# Patient Record
Sex: MALE | Race: WHITE | NOT HISPANIC OR LATINO | URBAN - METROPOLITAN AREA
[De-identification: names, ages, dates, MRNs, and addresses within clinical notes are randomized per-mention and may not be internally consistent; named-entity substitution may affect disease eponyms.]

---

## 2023-09-24 VITALS
HEART RATE: 89 BPM | WEIGHT: 199.96 LBS | HEIGHT: 72 IN | DIASTOLIC BLOOD PRESSURE: 87 MMHG | RESPIRATION RATE: 16 BRPM | OXYGEN SATURATION: 96 % | TEMPERATURE: 99 F | SYSTOLIC BLOOD PRESSURE: 142 MMHG

## 2023-09-24 LAB
ALBUMIN SERPL ELPH-MCNC: 3.8 G/DL — SIGNIFICANT CHANGE UP (ref 3.4–5)
ALP SERPL-CCNC: 56 U/L — SIGNIFICANT CHANGE UP (ref 40–120)
ALT FLD-CCNC: 25 U/L — SIGNIFICANT CHANGE UP (ref 12–42)
ANION GAP SERPL CALC-SCNC: 8 MMOL/L — LOW (ref 9–16)
APPEARANCE UR: CLEAR — SIGNIFICANT CHANGE UP
AST SERPL-CCNC: 13 U/L — LOW (ref 15–37)
BASOPHILS # BLD AUTO: 0 K/UL — SIGNIFICANT CHANGE UP (ref 0–0.2)
BASOPHILS NFR BLD AUTO: 0 % — SIGNIFICANT CHANGE UP (ref 0–2)
BILIRUB SERPL-MCNC: 1.9 MG/DL — HIGH (ref 0.2–1.2)
BILIRUB UR-MCNC: NEGATIVE — SIGNIFICANT CHANGE UP
BUN SERPL-MCNC: 15 MG/DL — SIGNIFICANT CHANGE UP (ref 7–23)
CALCIUM SERPL-MCNC: 9.1 MG/DL — SIGNIFICANT CHANGE UP (ref 8.5–10.5)
CHLORIDE SERPL-SCNC: 101 MMOL/L — SIGNIFICANT CHANGE UP (ref 96–108)
CO2 SERPL-SCNC: 26 MMOL/L — SIGNIFICANT CHANGE UP (ref 22–31)
COLOR SPEC: YELLOW — SIGNIFICANT CHANGE UP
CREAT SERPL-MCNC: 1.11 MG/DL — SIGNIFICANT CHANGE UP (ref 0.5–1.3)
DIFF PNL FLD: NEGATIVE — SIGNIFICANT CHANGE UP
EGFR: 80 ML/MIN/1.73M2 — SIGNIFICANT CHANGE UP
EOSINOPHIL # BLD AUTO: 0 K/UL — SIGNIFICANT CHANGE UP (ref 0–0.5)
EOSINOPHIL NFR BLD AUTO: 0 % — SIGNIFICANT CHANGE UP (ref 0–6)
GLUCOSE SERPL-MCNC: 166 MG/DL — HIGH (ref 70–99)
GLUCOSE UR QL: NEGATIVE MG/DL — SIGNIFICANT CHANGE UP
HCT VFR BLD CALC: 42.4 % — SIGNIFICANT CHANGE UP (ref 39–50)
HGB BLD-MCNC: 14.7 G/DL — SIGNIFICANT CHANGE UP (ref 13–17)
KETONES UR-MCNC: NEGATIVE MG/DL — SIGNIFICANT CHANGE UP
LEUKOCYTE ESTERASE UR-ACNC: NEGATIVE — SIGNIFICANT CHANGE UP
LIDOCAIN IGE QN: 60 U/L — SIGNIFICANT CHANGE UP (ref 16–77)
LYMPHOCYTES # BLD AUTO: 0.95 K/UL — LOW (ref 1–3.3)
LYMPHOCYTES # BLD AUTO: 4 % — LOW (ref 13–44)
MCHC RBC-ENTMCNC: 32 PG — SIGNIFICANT CHANGE UP (ref 27–34)
MCHC RBC-ENTMCNC: 34.7 GM/DL — SIGNIFICANT CHANGE UP (ref 32–36)
MCV RBC AUTO: 92.4 FL — SIGNIFICANT CHANGE UP (ref 80–100)
MONOCYTES # BLD AUTO: 2.61 K/UL — HIGH (ref 0–0.9)
MONOCYTES NFR BLD AUTO: 11 % — SIGNIFICANT CHANGE UP (ref 2–14)
NEUTROPHILS # BLD AUTO: 20.19 K/UL — HIGH (ref 1.8–7.4)
NEUTROPHILS NFR BLD AUTO: 78 % — HIGH (ref 43–77)
NITRITE UR-MCNC: NEGATIVE — SIGNIFICANT CHANGE UP
NRBC # BLD: SIGNIFICANT CHANGE UP /100 WBCS (ref 0–0)
PH UR: 6.5 — SIGNIFICANT CHANGE UP (ref 5–8)
PLATELET # BLD AUTO: 172 K/UL — SIGNIFICANT CHANGE UP (ref 150–400)
POTASSIUM SERPL-MCNC: 3.9 MMOL/L — SIGNIFICANT CHANGE UP (ref 3.5–5.3)
POTASSIUM SERPL-SCNC: 3.9 MMOL/L — SIGNIFICANT CHANGE UP (ref 3.5–5.3)
PROT SERPL-MCNC: 7.2 G/DL — SIGNIFICANT CHANGE UP (ref 6.4–8.2)
PROT UR-MCNC: NEGATIVE MG/DL — SIGNIFICANT CHANGE UP
RBC # BLD: 4.59 M/UL — SIGNIFICANT CHANGE UP (ref 4.2–5.8)
RBC # FLD: 11.9 % — SIGNIFICANT CHANGE UP (ref 10.3–14.5)
SODIUM SERPL-SCNC: 135 MMOL/L — SIGNIFICANT CHANGE UP (ref 132–145)
SP GR SPEC: 1.03 — SIGNIFICANT CHANGE UP (ref 1–1.03)
UROBILINOGEN FLD QL: 0.2 MG/DL — SIGNIFICANT CHANGE UP (ref 0.2–1)
WBC # BLD: 23.75 K/UL — HIGH (ref 3.8–10.5)
WBC # FLD AUTO: 23.75 K/UL — HIGH (ref 3.8–10.5)

## 2023-09-24 PROCEDURE — 74177 CT ABD & PELVIS W/CONTRAST: CPT | Mod: 26

## 2023-09-24 PROCEDURE — 99285 EMERGENCY DEPT VISIT HI MDM: CPT

## 2023-09-24 RX ORDER — SODIUM CHLORIDE 9 MG/ML
1000 INJECTION INTRAMUSCULAR; INTRAVENOUS; SUBCUTANEOUS ONCE
Refills: 0 | Status: COMPLETED | OUTPATIENT
Start: 2023-09-24 | End: 2023-09-24

## 2023-09-24 RX ORDER — MORPHINE SULFATE 50 MG/1
4 CAPSULE, EXTENDED RELEASE ORAL ONCE
Refills: 0 | Status: DISCONTINUED | OUTPATIENT
Start: 2023-09-24 | End: 2023-09-24

## 2023-09-24 RX ORDER — SODIUM CHLORIDE 9 MG/ML
1000 INJECTION, SOLUTION INTRAVENOUS
Refills: 0 | Status: COMPLETED | OUTPATIENT
Start: 2023-09-24 | End: 2023-09-25

## 2023-09-24 RX ORDER — PIPERACILLIN AND TAZOBACTAM 4; .5 G/20ML; G/20ML
3.38 INJECTION, POWDER, LYOPHILIZED, FOR SOLUTION INTRAVENOUS ONCE
Refills: 0 | Status: COMPLETED | OUTPATIENT
Start: 2023-09-24 | End: 2023-09-24

## 2023-09-24 RX ORDER — ONDANSETRON 8 MG/1
4 TABLET, FILM COATED ORAL ONCE
Refills: 0 | Status: COMPLETED | OUTPATIENT
Start: 2023-09-24 | End: 2023-09-24

## 2023-09-24 RX ORDER — FAMOTIDINE 10 MG/ML
20 INJECTION INTRAVENOUS ONCE
Refills: 0 | Status: COMPLETED | OUTPATIENT
Start: 2023-09-24 | End: 2023-09-24

## 2023-09-24 RX ORDER — KETOROLAC TROMETHAMINE 30 MG/ML
15 SYRINGE (ML) INJECTION ONCE
Refills: 0 | Status: DISCONTINUED | OUTPATIENT
Start: 2023-09-24 | End: 2023-09-24

## 2023-09-24 RX ADMIN — Medication 15 MILLIGRAM(S): at 21:15

## 2023-09-24 RX ADMIN — PIPERACILLIN AND TAZOBACTAM 200 GRAM(S): 4; .5 INJECTION, POWDER, LYOPHILIZED, FOR SOLUTION INTRAVENOUS at 21:53

## 2023-09-24 RX ADMIN — ONDANSETRON 4 MILLIGRAM(S): 8 TABLET, FILM COATED ORAL at 21:15

## 2023-09-24 RX ADMIN — FAMOTIDINE 20 MILLIGRAM(S): 10 INJECTION INTRAVENOUS at 21:15

## 2023-09-24 RX ADMIN — MORPHINE SULFATE 4 MILLIGRAM(S): 50 CAPSULE, EXTENDED RELEASE ORAL at 21:53

## 2023-09-24 RX ADMIN — SODIUM CHLORIDE 1000 MILLILITER(S): 9 INJECTION INTRAMUSCULAR; INTRAVENOUS; SUBCUTANEOUS at 21:15

## 2023-09-24 NOTE — ED PROVIDER NOTE - GENITOURINARY NEGATIVE STATEMENT, MLM
Left message on answering machine to call back. Patient was due for INR recheck on 12/31/18. Lab order already in place.
no dysuria, no frequency, and no hematuria.

## 2023-09-24 NOTE — ED ADULT NURSE NOTE - OBJECTIVE STATEMENT
Pt. walk in from urgent care c/o RLQ abd pain since last night with fever (100.9 at home) and vomiting.

## 2023-09-24 NOTE — ED PROVIDER NOTE - NS ED ATTENDING STATEMENT MOD
This was a shared visit with the EULA. I reviewed and verified the documentation and independently performed the documented:

## 2023-09-24 NOTE — ED PROVIDER NOTE - ATTENDING APP SHARED VISIT CONTRIBUTION OF CARE
I saw and evaluated the patient. I discussed the case with the EULA/resident and agree with the findings and helped develop the plan of care as documented in the EULA/resident's note. I agree with the findings and plan of care as documented in the EULA/resident's note.

## 2023-09-24 NOTE — ED PROVIDER NOTE - PHYSICAL EXAMINATION
Gen - WDWN M, NAD, comfortable and non-toxic appearing  Skin - warm, dry, intact   HEENT - AT/NC, no nasal discharge, airway patent, neck supple and FROM  CV - S1S2, R/R/R  Resp - CTAB, no r/r/w  GI - NABS, soft, ND, RLQ TTP with guarding, no rebound, no CVAT b/l   MS - No acute or gross deformities noted to extremities. No midline spinal tenderness or step off on palpation  Neuro - AxOx3, ambulatory without gait disturbance

## 2023-09-24 NOTE — ED PROVIDER NOTE - CLINICAL SUMMARY MEDICAL DECISION MAKING FREE TEXT BOX
54 yo M with PMHx of GERD, presenting c/o abdominal pain with N/V/C x 1d  - check labs, U/A, IV hydration, antiemetics, pain control, CT A/P and reassess  DDx - appendicitis, biliary colic, constipation, UTI, renal colic 52 yo M with PMHx of GERD, presenting c/o abdominal pain with N/V/C x 1d  - check labs, U/A, IV hydration, antiemetics, pain control, CT A/P and reassess  DDx - appendicitis, biliary colic, constipation, UTI, renal colic, perforated viscus    reassessed 11:00pm - pain is improved but still persistent, afebrile, VSS otherwise, abd soft, awaiting final CTr     CT A/P - 1.  Acute appendicitis with retrocecal appendix. No extraluminal gas or   organized collection.  2.  1.2 cm hypodense liver lesion in hepatic segment 8. MRI of the   abdomen with and without gadolinium would be useful for further   characterization of liver lesion.  3.  Hook configuration of the proximal celiac artery, which could be seen   with median arcuate ligament syndrome. Clinical correlation is   recommended.  4.  1.2 cm rounded lucent focus in the left iliac bone, with narrow   sclerotic margins and central sclerotic nidus, probably representing   osteoid osteoma  dx and incidental findings discussed with pt and spouse at bedside, keep NPO, pain control, IVF, will transfer to [facility] under Dr. Orr's service for acute appy, plan for OR intervention tonight

## 2023-09-24 NOTE — ED ADULT TRIAGE NOTE - CHIEF COMPLAINT QUOTE
Pt. walk in from urgent care c/o RLQ abd pain since last night with fever (100.9 at home) and vomiting. Pt. ttp RUQ.

## 2023-09-25 ENCOUNTER — INPATIENT (INPATIENT)
Facility: HOSPITAL | Age: 53
LOS: 3 days | Discharge: HOME | DRG: 853 | End: 2023-09-29
Attending: SURGERY | Admitting: SURGERY
Payer: COMMERCIAL

## 2023-09-25 DIAGNOSIS — Z98.890 OTHER SPECIFIED POSTPROCEDURAL STATES: Chronic | ICD-10-CM

## 2023-09-25 LAB
ALBUMIN SERPL ELPH-MCNC: 3.3 G/DL — SIGNIFICANT CHANGE UP (ref 3.3–5)
ALBUMIN SERPL ELPH-MCNC: 3.3 G/DL — SIGNIFICANT CHANGE UP (ref 3.3–5)
ALP SERPL-CCNC: 37 U/L — LOW (ref 40–120)
ALP SERPL-CCNC: 42 U/L — SIGNIFICANT CHANGE UP (ref 40–120)
ALT FLD-CCNC: 13 U/L — SIGNIFICANT CHANGE UP (ref 10–45)
ALT FLD-CCNC: 13 U/L — SIGNIFICANT CHANGE UP (ref 10–45)
ANION GAP SERPL CALC-SCNC: 8 MMOL/L — SIGNIFICANT CHANGE UP (ref 5–17)
APTT BLD: 28.3 SEC — SIGNIFICANT CHANGE UP (ref 24.5–35.6)
AST SERPL-CCNC: 10 U/L — SIGNIFICANT CHANGE UP (ref 10–40)
AST SERPL-CCNC: 9 U/L — LOW (ref 10–40)
BILIRUB DIRECT SERPL-MCNC: 0.5 MG/DL — HIGH (ref 0–0.3)
BILIRUB INDIRECT FLD-MCNC: 1.8 MG/DL — HIGH (ref 0.2–1)
BILIRUB SERPL-MCNC: 2.2 MG/DL — HIGH (ref 0.2–1.2)
BILIRUB SERPL-MCNC: 2.3 MG/DL — HIGH (ref 0.2–1.2)
BLD GP AB SCN SERPL QL: NEGATIVE — SIGNIFICANT CHANGE UP
BLD GP AB SCN SERPL QL: NEGATIVE — SIGNIFICANT CHANGE UP
BUN SERPL-MCNC: 13 MG/DL — SIGNIFICANT CHANGE UP (ref 7–23)
CALCIUM SERPL-MCNC: 8.7 MG/DL — SIGNIFICANT CHANGE UP (ref 8.4–10.5)
CHLORIDE SERPL-SCNC: 106 MMOL/L — SIGNIFICANT CHANGE UP (ref 96–108)
CO2 SERPL-SCNC: 24 MMOL/L — SIGNIFICANT CHANGE UP (ref 22–31)
CREAT SERPL-MCNC: 1.14 MG/DL — SIGNIFICANT CHANGE UP (ref 0.5–1.3)
EGFR: 77 ML/MIN/1.73M2 — SIGNIFICANT CHANGE UP
GLUCOSE SERPL-MCNC: 165 MG/DL — HIGH (ref 70–99)
GRAM STN FLD: SIGNIFICANT CHANGE UP
HCT VFR BLD CALC: 39.2 % — SIGNIFICANT CHANGE UP (ref 39–50)
HGB BLD-MCNC: 13.7 G/DL — SIGNIFICANT CHANGE UP (ref 13–17)
INR BLD: 1.16 — SIGNIFICANT CHANGE UP (ref 0.85–1.18)
LACTATE BLDV-MCNC: 1.2 MMOL/L — SIGNIFICANT CHANGE UP (ref 0.5–2)
MAGNESIUM SERPL-MCNC: 1.5 MG/DL — LOW (ref 1.6–2.6)
MCHC RBC-ENTMCNC: 32.2 PG — SIGNIFICANT CHANGE UP (ref 27–34)
MCHC RBC-ENTMCNC: 34.9 GM/DL — SIGNIFICANT CHANGE UP (ref 32–36)
MCV RBC AUTO: 92.2 FL — SIGNIFICANT CHANGE UP (ref 80–100)
NRBC # BLD: 0 /100 WBCS — SIGNIFICANT CHANGE UP (ref 0–0)
PHOSPHATE SERPL-MCNC: 2.6 MG/DL — SIGNIFICANT CHANGE UP (ref 2.5–4.5)
PLATELET # BLD AUTO: 134 K/UL — LOW (ref 150–400)
POTASSIUM SERPL-MCNC: 4.3 MMOL/L — SIGNIFICANT CHANGE UP (ref 3.5–5.3)
POTASSIUM SERPL-SCNC: 4.3 MMOL/L — SIGNIFICANT CHANGE UP (ref 3.5–5.3)
PROT SERPL-MCNC: 5.8 G/DL — LOW (ref 6–8.3)
PROT SERPL-MCNC: 5.8 G/DL — LOW (ref 6–8.3)
PROTHROM AB SERPL-ACNC: 13 SEC — SIGNIFICANT CHANGE UP (ref 9.5–13)
RBC # BLD: 4.25 M/UL — SIGNIFICANT CHANGE UP (ref 4.2–5.8)
RBC # FLD: 12.2 % — SIGNIFICANT CHANGE UP (ref 10.3–14.5)
RH IG SCN BLD-IMP: POSITIVE — SIGNIFICANT CHANGE UP
RH IG SCN BLD-IMP: POSITIVE — SIGNIFICANT CHANGE UP
SODIUM SERPL-SCNC: 138 MMOL/L — SIGNIFICANT CHANGE UP (ref 135–145)
SPECIMEN SOURCE: SIGNIFICANT CHANGE UP
WBC # BLD: 17.8 K/UL — HIGH (ref 3.8–10.5)
WBC # FLD AUTO: 17.8 K/UL — HIGH (ref 3.8–10.5)

## 2023-09-25 PROCEDURE — 88305 TISSUE EXAM BY PATHOLOGIST: CPT | Mod: 26

## 2023-09-25 PROCEDURE — 99222 1ST HOSP IP/OBS MODERATE 55: CPT

## 2023-09-25 PROCEDURE — 88112 CYTOPATH CELL ENHANCE TECH: CPT | Mod: 26

## 2023-09-25 PROCEDURE — 88304 TISSUE EXAM BY PATHOLOGIST: CPT | Mod: 26

## 2023-09-25 RX ORDER — HYDROMORPHONE HYDROCHLORIDE 2 MG/ML
0.5 INJECTION INTRAMUSCULAR; INTRAVENOUS; SUBCUTANEOUS
Refills: 0 | Status: DISCONTINUED | OUTPATIENT
Start: 2023-09-25 | End: 2023-09-26

## 2023-09-25 RX ORDER — ONDANSETRON 8 MG/1
4 TABLET, FILM COATED ORAL EVERY 6 HOURS
Refills: 0 | Status: DISCONTINUED | OUTPATIENT
Start: 2023-09-25 | End: 2023-09-25

## 2023-09-25 RX ORDER — HYDROMORPHONE HYDROCHLORIDE 2 MG/ML
0.25 INJECTION INTRAMUSCULAR; INTRAVENOUS; SUBCUTANEOUS EVERY 4 HOURS
Refills: 0 | Status: DISCONTINUED | OUTPATIENT
Start: 2023-09-25 | End: 2023-09-27

## 2023-09-25 RX ORDER — HEPARIN SODIUM 5000 [USP'U]/ML
5000 INJECTION INTRAVENOUS; SUBCUTANEOUS EVERY 8 HOURS
Refills: 0 | Status: DISCONTINUED | OUTPATIENT
Start: 2023-09-25 | End: 2023-09-29

## 2023-09-25 RX ORDER — HYDROMORPHONE HYDROCHLORIDE 2 MG/ML
0.5 INJECTION INTRAMUSCULAR; INTRAVENOUS; SUBCUTANEOUS EVERY 4 HOURS
Refills: 0 | Status: DISCONTINUED | OUTPATIENT
Start: 2023-09-25 | End: 2023-09-27

## 2023-09-25 RX ORDER — ONDANSETRON 8 MG/1
4 TABLET, FILM COATED ORAL EVERY 6 HOURS
Refills: 0 | Status: DISCONTINUED | OUTPATIENT
Start: 2023-09-25 | End: 2023-09-29

## 2023-09-25 RX ORDER — ACETAMINOPHEN 500 MG
1000 TABLET ORAL ONCE
Refills: 0 | Status: COMPLETED | OUTPATIENT
Start: 2023-09-25 | End: 2023-09-25

## 2023-09-25 RX ORDER — PIPERACILLIN AND TAZOBACTAM 4; .5 G/20ML; G/20ML
3.38 INJECTION, POWDER, LYOPHILIZED, FOR SOLUTION INTRAVENOUS EVERY 8 HOURS
Refills: 0 | Status: DISCONTINUED | OUTPATIENT
Start: 2023-09-25 | End: 2023-09-29

## 2023-09-25 RX ADMIN — HEPARIN SODIUM 5000 UNIT(S): 5000 INJECTION INTRAVENOUS; SUBCUTANEOUS at 06:54

## 2023-09-25 RX ADMIN — HEPARIN SODIUM 5000 UNIT(S): 5000 INJECTION INTRAVENOUS; SUBCUTANEOUS at 21:03

## 2023-09-25 RX ADMIN — PIPERACILLIN AND TAZOBACTAM 25 GRAM(S): 4; .5 INJECTION, POWDER, LYOPHILIZED, FOR SOLUTION INTRAVENOUS at 14:15

## 2023-09-25 RX ADMIN — Medication 15 MILLIGRAM(S): at 00:22

## 2023-09-25 RX ADMIN — SODIUM CHLORIDE 125 MILLILITER(S): 9 INJECTION, SOLUTION INTRAVENOUS at 01:06

## 2023-09-25 RX ADMIN — PIPERACILLIN AND TAZOBACTAM 25 GRAM(S): 4; .5 INJECTION, POWDER, LYOPHILIZED, FOR SOLUTION INTRAVENOUS at 21:03

## 2023-09-25 RX ADMIN — HEPARIN SODIUM 5000 UNIT(S): 5000 INJECTION INTRAVENOUS; SUBCUTANEOUS at 14:14

## 2023-09-25 RX ADMIN — SODIUM CHLORIDE 125 MILLILITER(S): 9 INJECTION, SOLUTION INTRAVENOUS at 21:04

## 2023-09-25 RX ADMIN — MORPHINE SULFATE 4 MILLIGRAM(S): 50 CAPSULE, EXTENDED RELEASE ORAL at 00:22

## 2023-09-25 RX ADMIN — PIPERACILLIN AND TAZOBACTAM 25 GRAM(S): 4; .5 INJECTION, POWDER, LYOPHILIZED, FOR SOLUTION INTRAVENOUS at 06:54

## 2023-09-25 RX ADMIN — Medication 1000 MILLIGRAM(S): at 17:03

## 2023-09-25 RX ADMIN — HYDROMORPHONE HYDROCHLORIDE 0.5 MILLIGRAM(S): 2 INJECTION INTRAMUSCULAR; INTRAVENOUS; SUBCUTANEOUS at 23:45

## 2023-09-25 RX ADMIN — Medication 400 MILLIGRAM(S): at 16:33

## 2023-09-25 RX ADMIN — HYDROMORPHONE HYDROCHLORIDE 0.5 MILLIGRAM(S): 2 INJECTION INTRAMUSCULAR; INTRAVENOUS; SUBCUTANEOUS at 23:21

## 2023-09-25 NOTE — H&P ADULT - HISTORY OF PRESENT ILLNESS
Patient is a 53 yr old male pmh of GERD who presented to [facility] ED after 1x day of severe RLQ abdominal pain, nausea, vomiting, chills and fever. Patient state she had been experiencing a bit of constipation earlier in the day for which he trialed a fleet enema and was successful in having a BM. Overnight he became constipated and felt bloated again and around 10 pm with severe sharp stabbing pain in the RLQ. Per patient the pain does not radiate anywhere and has been constant with waxing and waning in severity since onset. He has had 4 episodes of NBNB emesis today and fever of 100.9 at home. Denies melena, hematochezia, hematuria, change in urinary function, dysuria, d/c, flank pain, HA, dizziness, focal weakness, CP, and SOB.    In the [facility] ED patient VSS were stable and abdominal exam was significant for NABS, soft, ND, RLQ TTP with guarding, no rebound     Patient is a 53 yr old male pmh of GERD who presented to [facility] ED after 1x day hx(Since 9/23) of severe RLQ abdominal pain, nausea, vomiting, chills and fever. Patient states he had been experiencing a bit of constipation earlier in during the day of 9/23 for which he trialed a fleet enema and was successful in having a BM. Overnight he became constipated and felt bloated again and around 10 pm felt severe sharp stabbing pain in the RLQ . Per patient the pain does not radiate anywhere and has been constant with waxing and waning in severity since onset. He has had 4 episodes of NBNB emesis during the day of 9/24 and fever of 100.9 at home. Denies melena, hematochezia, hematuria, change in urinary function, dysuria, d/c, flank pain, HA, dizziness, focal weakness, CP, and SOB.    In the [facility] ED patient VSS were stable and abdominal exam was, soft, ND, RLQ TTP with guarding, no rebound. His Labs were significant for a wbc 23.75, hgb 14.7. CTAP w/ IV contrast revealed a dilated appendix up to 2.1 cm (2:98), contains fluid and fecal material, and demonstrates pronounced surrounding inflammatory changes consistent with acute appendicitis. Patient has not eaten or drank since 5 pm 9/24. He was made NPO and Started on IVF and given 1 dose of zosyn in ED.     Patient is a 53 yr old male pmh of GERD who presented to [facility] ED after 1x day hx(Since 9/23) of severe RLQ abdominal pain, nausea, vomiting, chills and fever. Patient states he had been experiencing a bit of constipation earlier during the day of 9/23 for which he trialed a fleet enema and was successful in having a BM. Overnight he became constipated and felt bloated again and around 10 pm felt severe sharp stabbing pain in the RLQ . Per patient the pain does not radiate anywhere and has been constant with waxing and waning in severity since onset. He has had 4 episodes of NBNB emesis during the day of 9/24 and fever of 100.9 at home. Denies melena, hematochezia, hematuria, change in urinary function, dysuria, d/c, flank pain, HA, dizziness, focal weakness, CP, and SOB.    In the [facility] ED patient VSS were stable and abdominal exam was, soft, ND, RLQ TTP with guarding, no rebound. His Labs were significant for a wbc 23.75, hgb 14.7. CTAP w/ IV contrast revealed a dilated appendix up to 2.1 cm (2:98), contains fluid and fecal material, and demonstrates pronounced surrounding inflammatory changes consistent with acute appendicitis. Patient has not eaten or drank since 5 pm 9/24. He was made NPO and Started on IVF and given 1 dose of zosyn in ED.

## 2023-09-25 NOTE — H&P ADULT - NSHPREVIEWOFSYSTEMS_GEN_ALL_CORE
Review of Systems:  · CONSTITUTIONAL: - - -  · Constitutional [+]: CHILLS, FEVER  · CARDIOVASCULAR: no chest pain and no edema.  · RESPIRATORY: no chest pain, no cough, and no shortness of breath.  · GASTROINTESTINAL: - - -  · Gastrointestinal [+]: ABDOMINAL PAIN, NAUSEA, VOMITING  · Gastrointestinal [-]: no diarrhea, no melena  · GENITOURINARY: no dysuria, no frequency, and no hematuria.  · MUSCULOSKELETAL: no back pain, no gout, no musculoskeletal pain, no neck pain, and no weakness.  · SKIN: no abrasions, no jaundice, no lesions, no pruritis, and no rashes.  · NEURO: no loss of consciousness, no gait abnormality, no headache, no sensory deficits, and no weakness.  · ROS STATEMENT: all other ROS negative except as per HPI

## 2023-09-25 NOTE — H&P ADULT - NSHPPHYSICALEXAM_GEN_ALL_CORE
T(C): 37.2 (09-25-23 @ 00:29), Max: 37.3 (09-24-23 @ 20:46)  HR: 100 (09-25-23 @ 01:06) (73 - 100)  BP: 142/66 (09-25-23 @ 01:06) (126/84 - 152/76)  RR: 18 (09-25-23 @ 01:06) (16 - 18)  SpO2: 97% (09-25-23 @ 01:06) (95% - 97%)    CONSTITUTIONAL: Well groomed, no apparent distress  EYES: PERRLA and symmetric, EOMI, No conjunctival or scleral injection, non-icteric  ENMT: Oral mucosa with moist membranes. Normal dentition; no pharyngeal injection or exudates   NECK: Supple, symmetric and without tracheal deviation   RESP: No respiratory distress, no use of accessory muscles; CTA b/l, no WRR  CV: RRR, +S1S2, no MRG; no JVD; no peripheral edema  GI: Soft, TENDER RLQ W/GUARDING , ND, no rebound, no palpable masses.  LYMPH: No cervical LAD or tenderness; no axillary LAD or tenderness; no inguinal LAD or tenderness  MSK: Normal gait; No digital clubbing or cyanosis; examination of the (head/neck/spine/ribs/pelvis, RUE, LUE, RLE, LLE) without misalignment,            Normal ROM without pain, no spinal tenderness, normal muscle strength/tone  SKIN: No rashes or ulcers noted; no subcutaneous nodules or induration palpable  NEURO: CN II-XII intact; normal reflexes in upper and lower extremities, sensation intact in upper and lower extremities b/l to light touch   PSYCH: Appropriate insight/judgment; A+O x 3, mood and affect appropriate, recent/remote memory intact

## 2023-09-25 NOTE — H&P ADULT - NSICDXFAMILYHX_GEN_ALL_CORE_FT
FAMILY HISTORY:  Father  Still living? Unknown  Family history of prostate cancer in father, Age at diagnosis: Age Unknown

## 2023-09-25 NOTE — H&P ADULT - ASSESSMENT
Patient is a 53 yr old male pmh of GERD who presented to [facility] ED after 1x day hx(Since 9/23) of severe RLQ abdominal pain, nausea, vomiting, chills and fever and labs significant for a WBC of 23.75 and CTAP of dilated 2.1cm appendix w/phlegmonous character consistent with a diagnosis of acute appendicitis.  Plan:  NPO/IVF  Pain/Nausea PRN  Zosyn  SCD/SQH  OR overnight

## 2023-09-25 NOTE — PROGRESS NOTE ADULT - ASSESSMENT
Patient is a 53 yr old male pmh of GERD who presented to [facility] ED after 1x day hx(Since 9/23) of severe RLQ abdominal pain, nausea, vomiting, chills and fever and labs significant for a WBC of 23.75 and CTAP of dilated 2.1cm appendix w/phlegmonous character consistent with a diagnosis of acute appendicitis.    NPO/IVF  Pain/Nausea PRN  Darline Landon  SCD/SQH  19 fr neto  AM labs  Tele

## 2023-09-25 NOTE — PATIENT PROFILE ADULT - FALL HARM RISK - ATTEMPT OOB
Has had several overdoses with prescription pills as per patient , the last in 2006. No defer details. tardive dyskinesia. COPD

## 2023-09-25 NOTE — PROGRESS NOTE ADULT - ASSESSMENT
Chief Complaint   Patient presents with     Hospital F/U     Inpatient discharge from [facility] on 7/13/2017 Prostate Cancer (H), Prostate Cancer       Melinda Bermeo/     acute Appendicitis s/p surgery  IV antibiotics  hyperbili likely from fasting in light of Gilbert's disease  patient has long history of elevated bili and with Gilbert's  when fasting it can go up  he has PCP in [facility] who sees him and mentioned bili in past  explained to him CT findings of liver  can follow up with PCP

## 2023-09-25 NOTE — BRIEF OPERATIVE NOTE - OPERATION/FINDINGS
Appendix identified gangrenous, purulent peritonitis of all 4 quadrants and pelvis. Mesoappendix divided using electrocautery. Appendix amputated at base at level of healthy tissue using PDS endoloop, amputation w/ ligasure, second endoloop over stump. Stump inspected laparoscopically. 9 L of NS washout. 19Fr neto drain placed. Fascia closed w/ 0 Maxon sutures. 4-0 monocryl skin. Dermabond.

## 2023-09-25 NOTE — H&P ADULT - NSHPLABSRESULTS_GEN_ALL_CORE
LABS:  cret                        14.7   23.75 )-----------( 172      ( 24 Sep 2023 21:06 )             42.4     09-24    135  |  101  |  15  ----------------------------<  166<H>  3.9   |  26  |  1.11    Ca    9.1      24 Sep 2023 21:06    TPro  7.2  /  Alb  3.8  /  TBili  1.9<H>  /  DBili  x   /  AST  13<L>  /  ALT  25  /  AlkPhos  56  09-24    PT/INR - ( 24 Sep 2023 23:45 )   PT: 13.0 sec;   INR: 1.16          PTT - ( 24 Sep 2023 23:45 )  PTT:28.3 sec        CTAP 9/24:  IMPRESSION:  1. Acute appendicitis with surrounding phlegmonous change. No extraluminal gas or organized collection.  2. Two ill-defined hypodense lesions in the right hepatic lobe measuring up to 1.2 cm, indeterminate. MRI of the abdomen with and without gadolinium would be useful for further characterization of liver lesion.  3. Two ill-defined enhancing lesions within the spleen measuring up to 1 cm, indeterminate. This can also be further evaluated on MRI

## 2023-09-25 NOTE — BRIEF OPERATIVE NOTE - NSICDXBRIEFPROCEDURE_GEN_ALL_CORE_FT
PROCEDURES:  Laparoscopic appendectomy 25-Sep-2023 04:02:52  Petra Giang  Laparoscopic washout of abdominal cavity 25-Sep-2023 04:02:56  Petra Giang

## 2023-09-26 LAB
ALBUMIN SERPL ELPH-MCNC: 3.2 G/DL — LOW (ref 3.3–5)
ALP SERPL-CCNC: 39 U/L — LOW (ref 40–120)
ALT FLD-CCNC: 12 U/L — SIGNIFICANT CHANGE UP (ref 10–45)
ANION GAP SERPL CALC-SCNC: 10 MMOL/L — SIGNIFICANT CHANGE UP (ref 5–17)
AST SERPL-CCNC: 10 U/L — SIGNIFICANT CHANGE UP (ref 10–40)
BILIRUB SERPL-MCNC: 2.2 MG/DL — HIGH (ref 0.2–1.2)
BUN SERPL-MCNC: 16 MG/DL — SIGNIFICANT CHANGE UP (ref 7–23)
CALCIUM SERPL-MCNC: 9 MG/DL — SIGNIFICANT CHANGE UP (ref 8.4–10.5)
CHLORIDE SERPL-SCNC: 102 MMOL/L — SIGNIFICANT CHANGE UP (ref 96–108)
CO2 SERPL-SCNC: 26 MMOL/L — SIGNIFICANT CHANGE UP (ref 22–31)
CREAT SERPL-MCNC: 1.13 MG/DL — SIGNIFICANT CHANGE UP (ref 0.5–1.3)
EGFR: 78 ML/MIN/1.73M2 — SIGNIFICANT CHANGE UP
GLUCOSE BLDC GLUCOMTR-MCNC: 134 MG/DL — HIGH (ref 70–99)
GLUCOSE SERPL-MCNC: 127 MG/DL — HIGH (ref 70–99)
HCT VFR BLD CALC: 38.9 % — LOW (ref 39–50)
HGB BLD-MCNC: 13.6 G/DL — SIGNIFICANT CHANGE UP (ref 13–17)
MAGNESIUM SERPL-MCNC: 1.9 MG/DL — SIGNIFICANT CHANGE UP (ref 1.6–2.6)
MCHC RBC-ENTMCNC: 32.5 PG — SIGNIFICANT CHANGE UP (ref 27–34)
MCHC RBC-ENTMCNC: 35 GM/DL — SIGNIFICANT CHANGE UP (ref 32–36)
MCV RBC AUTO: 92.8 FL — SIGNIFICANT CHANGE UP (ref 80–100)
NRBC # BLD: 0 /100 WBCS — SIGNIFICANT CHANGE UP (ref 0–0)
PHOSPHATE SERPL-MCNC: 2 MG/DL — LOW (ref 2.5–4.5)
PLATELET # BLD AUTO: 126 K/UL — LOW (ref 150–400)
POTASSIUM SERPL-MCNC: 3.9 MMOL/L — SIGNIFICANT CHANGE UP (ref 3.5–5.3)
POTASSIUM SERPL-SCNC: 3.9 MMOL/L — SIGNIFICANT CHANGE UP (ref 3.5–5.3)
PROT SERPL-MCNC: 6.1 G/DL — SIGNIFICANT CHANGE UP (ref 6–8.3)
RBC # BLD: 4.19 M/UL — LOW (ref 4.2–5.8)
RBC # FLD: 12.3 % — SIGNIFICANT CHANGE UP (ref 10.3–14.5)
SODIUM SERPL-SCNC: 138 MMOL/L — SIGNIFICANT CHANGE UP (ref 135–145)
WBC # BLD: 13.94 K/UL — HIGH (ref 3.8–10.5)
WBC # FLD AUTO: 13.94 K/UL — HIGH (ref 3.8–10.5)

## 2023-09-26 PROCEDURE — 99232 SBSQ HOSP IP/OBS MODERATE 35: CPT

## 2023-09-26 PROCEDURE — 71045 X-RAY EXAM CHEST 1 VIEW: CPT | Mod: 26

## 2023-09-26 RX ORDER — POTASSIUM PHOSPHATE, MONOBASIC POTASSIUM PHOSPHATE, DIBASIC 236; 224 MG/ML; MG/ML
15 INJECTION, SOLUTION INTRAVENOUS ONCE
Refills: 0 | Status: COMPLETED | OUTPATIENT
Start: 2023-09-26 | End: 2023-09-26

## 2023-09-26 RX ORDER — ACETAMINOPHEN 500 MG
1000 TABLET ORAL ONCE
Refills: 0 | Status: COMPLETED | OUTPATIENT
Start: 2023-09-26 | End: 2023-09-26

## 2023-09-26 RX ORDER — MAGNESIUM SULFATE 500 MG/ML
1 VIAL (ML) INJECTION ONCE
Refills: 0 | Status: COMPLETED | OUTPATIENT
Start: 2023-09-26 | End: 2023-09-26

## 2023-09-26 RX ORDER — FAMOTIDINE 10 MG/ML
20 INJECTION INTRAVENOUS DAILY
Refills: 0 | Status: DISCONTINUED | OUTPATIENT
Start: 2023-09-26 | End: 2023-09-26

## 2023-09-26 RX ORDER — PANTOPRAZOLE SODIUM 20 MG/1
40 TABLET, DELAYED RELEASE ORAL DAILY
Refills: 0 | Status: DISCONTINUED | OUTPATIENT
Start: 2023-09-26 | End: 2023-09-29

## 2023-09-26 RX ORDER — LIDOCAINE HCL 20 MG/ML
10 VIAL (ML) INJECTION ONCE
Refills: 0 | Status: COMPLETED | OUTPATIENT
Start: 2023-09-26 | End: 2023-09-26

## 2023-09-26 RX ORDER — METOCLOPRAMIDE HCL 10 MG
10 TABLET ORAL
Refills: 0 | Status: DISCONTINUED | OUTPATIENT
Start: 2023-09-26 | End: 2023-09-29

## 2023-09-26 RX ORDER — SODIUM CHLORIDE 9 MG/ML
1000 INJECTION, SOLUTION INTRAVENOUS
Refills: 0 | Status: DISCONTINUED | OUTPATIENT
Start: 2023-09-26 | End: 2023-09-27

## 2023-09-26 RX ORDER — METOCLOPRAMIDE HCL 10 MG
10 TABLET ORAL ONCE
Refills: 0 | Status: COMPLETED | OUTPATIENT
Start: 2023-09-26 | End: 2023-09-26

## 2023-09-26 RX ORDER — TAMSULOSIN HYDROCHLORIDE 0.4 MG/1
0.4 CAPSULE ORAL AT BEDTIME
Refills: 0 | Status: DISCONTINUED | OUTPATIENT
Start: 2023-09-26 | End: 2023-09-26

## 2023-09-26 RX ADMIN — PIPERACILLIN AND TAZOBACTAM 25 GRAM(S): 4; .5 INJECTION, POWDER, LYOPHILIZED, FOR SOLUTION INTRAVENOUS at 14:33

## 2023-09-26 RX ADMIN — SODIUM CHLORIDE 130 MILLILITER(S): 9 INJECTION, SOLUTION INTRAVENOUS at 04:05

## 2023-09-26 RX ADMIN — HEPARIN SODIUM 5000 UNIT(S): 5000 INJECTION INTRAVENOUS; SUBCUTANEOUS at 05:00

## 2023-09-26 RX ADMIN — PIPERACILLIN AND TAZOBACTAM 25 GRAM(S): 4; .5 INJECTION, POWDER, LYOPHILIZED, FOR SOLUTION INTRAVENOUS at 21:03

## 2023-09-26 RX ADMIN — Medication 100 GRAM(S): at 08:38

## 2023-09-26 RX ADMIN — HYDROMORPHONE HYDROCHLORIDE 0.5 MILLIGRAM(S): 2 INJECTION INTRAMUSCULAR; INTRAVENOUS; SUBCUTANEOUS at 05:03

## 2023-09-26 RX ADMIN — Medication 400 MILLIGRAM(S): at 17:30

## 2023-09-26 RX ADMIN — FAMOTIDINE 20 MILLIGRAM(S): 10 INJECTION INTRAVENOUS at 10:13

## 2023-09-26 RX ADMIN — ONDANSETRON 4 MILLIGRAM(S): 8 TABLET, FILM COATED ORAL at 16:48

## 2023-09-26 RX ADMIN — SODIUM CHLORIDE 130 MILLILITER(S): 9 INJECTION, SOLUTION INTRAVENOUS at 12:03

## 2023-09-26 RX ADMIN — SODIUM CHLORIDE 130 MILLILITER(S): 9 INJECTION, SOLUTION INTRAVENOUS at 21:03

## 2023-09-26 RX ADMIN — Medication 10 MILLILITER(S): at 17:30

## 2023-09-26 RX ADMIN — PIPERACILLIN AND TAZOBACTAM 25 GRAM(S): 4; .5 INJECTION, POWDER, LYOPHILIZED, FOR SOLUTION INTRAVENOUS at 05:00

## 2023-09-26 RX ADMIN — Medication 10 MILLIGRAM(S): at 11:58

## 2023-09-26 RX ADMIN — Medication 1000 MILLIGRAM(S): at 17:45

## 2023-09-26 RX ADMIN — HEPARIN SODIUM 5000 UNIT(S): 5000 INJECTION INTRAVENOUS; SUBCUTANEOUS at 21:03

## 2023-09-26 RX ADMIN — PANTOPRAZOLE SODIUM 40 MILLIGRAM(S): 20 TABLET, DELAYED RELEASE ORAL at 18:02

## 2023-09-26 RX ADMIN — HYDROMORPHONE HYDROCHLORIDE 0.5 MILLIGRAM(S): 2 INJECTION INTRAMUSCULAR; INTRAVENOUS; SUBCUTANEOUS at 05:20

## 2023-09-26 RX ADMIN — HEPARIN SODIUM 5000 UNIT(S): 5000 INJECTION INTRAVENOUS; SUBCUTANEOUS at 14:33

## 2023-09-26 RX ADMIN — POTASSIUM PHOSPHATE, MONOBASIC POTASSIUM PHOSPHATE, DIBASIC 62.5 MILLIMOLE(S): 236; 224 INJECTION, SOLUTION INTRAVENOUS at 10:13

## 2023-09-26 NOTE — PROGRESS NOTE ADULT - ASSESSMENT
Patient is a 53 yr old male pmh of GERD who presented to [facility] ED after 1x day hx(Since 9/23) of severe RLQ abdominal pain, nausea, vomiting, chills and fever and labs significant for a WBC of 23.75 and CTAP of dilated 2.1cm appendix w/phlegmonous character consistent with a diagnosis of acute appendicitis. He is now s/p laparoscopic appendectomy on 9/25    CLD/IVF  Pain/Nausea PRN  Darline Landon  SCD/SQH  19 fr neto  AM labs  Tele

## 2023-09-26 NOTE — PROGRESS NOTE ADULT - ASSESSMENT
53 yr old male pmh of GERD who presented to [facility] ED after 1x day hx(Since 9/23) of severe RLQ abdominal pain, nausea, vomiting, chills and fever and labs significant for a WBC of 23.75 and CTAP of dilated 2.1cm appendix w/phlegmonous character consistent with a diagnosis of acute appendicitis.    Acute appendicitis  Post op state  On Pip/Tazo, WBC improving, follow-up OR cultures  Pain control, OOB, IS  Diet advancement per primary team    Thrombocytopenia  Probably reactive in setting of above  Monitor    Elevated bilirubin  Appreciate GI    Hypomagnesemia  Replete    Hypophosphatemia  Replete    DVT ppx; per primary team  Discussed with primary team

## 2023-09-26 NOTE — PROGRESS NOTE ADULT - ASSESSMENT
continue antibiotics  wbc better  dressings per surgical team  bili stable  acitively regurgitating so i ordered one dose reglan

## 2023-09-27 LAB
-  AMPICILLIN/SULBACTAM: SIGNIFICANT CHANGE UP
-  AMPICILLIN: SIGNIFICANT CHANGE UP
-  AZTREONAM: SIGNIFICANT CHANGE UP
-  CEFAZOLIN: SIGNIFICANT CHANGE UP
-  CEFEPIME: SIGNIFICANT CHANGE UP
-  CEFTRIAXONE: SIGNIFICANT CHANGE UP
-  CEFTRIAXONE: SIGNIFICANT CHANGE UP
-  CIPROFLOXACIN: SIGNIFICANT CHANGE UP
-  CIPROFLOXACIN: SIGNIFICANT CHANGE UP
-  CLINDAMYCIN: SIGNIFICANT CHANGE UP
-  ERTAPENEM: SIGNIFICANT CHANGE UP
-  ERYTHROMYCIN: SIGNIFICANT CHANGE UP
-  GENTAMICIN: SIGNIFICANT CHANGE UP
-  LEVOFLOXACIN: SIGNIFICANT CHANGE UP
-  LEVOFLOXACIN: SIGNIFICANT CHANGE UP
-  PENICILLIN: SIGNIFICANT CHANGE UP
-  PIPERACILLIN/TAZOBACTAM: SIGNIFICANT CHANGE UP
-  PIPERACILLIN/TAZOBACTAM: SIGNIFICANT CHANGE UP
-  TOBRAMYCIN: SIGNIFICANT CHANGE UP
-  TOBRAMYCIN: SIGNIFICANT CHANGE UP
-  TRIMETHOPRIM/SULFAMETHOXAZOLE: SIGNIFICANT CHANGE UP
-  VANCOMYCIN: SIGNIFICANT CHANGE UP
ALBUMIN SERPL ELPH-MCNC: 2.8 G/DL — LOW (ref 3.3–5)
ALP SERPL-CCNC: 40 U/L — SIGNIFICANT CHANGE UP (ref 40–120)
ALT FLD-CCNC: 11 U/L — SIGNIFICANT CHANGE UP (ref 10–45)
ANION GAP SERPL CALC-SCNC: 8 MMOL/L — SIGNIFICANT CHANGE UP (ref 5–17)
AST SERPL-CCNC: 9 U/L — LOW (ref 10–40)
BILIRUB DIRECT SERPL-MCNC: 0.4 MG/DL — HIGH (ref 0–0.3)
BILIRUB INDIRECT FLD-MCNC: 0.9 MG/DL — SIGNIFICANT CHANGE UP (ref 0.2–1)
BILIRUB SERPL-MCNC: 1.2 MG/DL — SIGNIFICANT CHANGE UP (ref 0.2–1.2)
BUN SERPL-MCNC: 20 MG/DL — SIGNIFICANT CHANGE UP (ref 7–23)
CALCIUM SERPL-MCNC: 8.4 MG/DL — SIGNIFICANT CHANGE UP (ref 8.4–10.5)
CHLORIDE SERPL-SCNC: 102 MMOL/L — SIGNIFICANT CHANGE UP (ref 96–108)
CO2 SERPL-SCNC: 28 MMOL/L — SIGNIFICANT CHANGE UP (ref 22–31)
CREAT SERPL-MCNC: 1.01 MG/DL — SIGNIFICANT CHANGE UP (ref 0.5–1.3)
EGFR: 89 ML/MIN/1.73M2 — SIGNIFICANT CHANGE UP
GLUCOSE SERPL-MCNC: 129 MG/DL — HIGH (ref 70–99)
HCT VFR BLD CALC: 40.3 % — SIGNIFICANT CHANGE UP (ref 39–50)
HGB BLD-MCNC: 13.7 G/DL — SIGNIFICANT CHANGE UP (ref 13–17)
MAGNESIUM SERPL-MCNC: 2 MG/DL — SIGNIFICANT CHANGE UP (ref 1.6–2.6)
MCHC RBC-ENTMCNC: 31.8 PG — SIGNIFICANT CHANGE UP (ref 27–34)
MCHC RBC-ENTMCNC: 34 GM/DL — SIGNIFICANT CHANGE UP (ref 32–36)
MCV RBC AUTO: 93.5 FL — SIGNIFICANT CHANGE UP (ref 80–100)
METHOD TYPE: SIGNIFICANT CHANGE UP
NON-GYNECOLOGICAL CYTOLOGY STUDY: SIGNIFICANT CHANGE UP
NRBC # BLD: 0 /100 WBCS — SIGNIFICANT CHANGE UP (ref 0–0)
PHOSPHATE SERPL-MCNC: 2 MG/DL — LOW (ref 2.5–4.5)
PLATELET # BLD AUTO: 149 K/UL — LOW (ref 150–400)
POTASSIUM SERPL-MCNC: 3.8 MMOL/L — SIGNIFICANT CHANGE UP (ref 3.5–5.3)
POTASSIUM SERPL-SCNC: 3.8 MMOL/L — SIGNIFICANT CHANGE UP (ref 3.5–5.3)
PROT SERPL-MCNC: 5.5 G/DL — LOW (ref 6–8.3)
RBC # BLD: 4.31 M/UL — SIGNIFICANT CHANGE UP (ref 4.2–5.8)
RBC # FLD: 12.3 % — SIGNIFICANT CHANGE UP (ref 10.3–14.5)
SODIUM SERPL-SCNC: 138 MMOL/L — SIGNIFICANT CHANGE UP (ref 135–145)
WBC # BLD: 12.29 K/UL — HIGH (ref 3.8–10.5)
WBC # FLD AUTO: 12.29 K/UL — HIGH (ref 3.8–10.5)

## 2023-09-27 PROCEDURE — 74019 RADEX ABDOMEN 2 VIEWS: CPT | Mod: 26

## 2023-09-27 PROCEDURE — 99232 SBSQ HOSP IP/OBS MODERATE 35: CPT

## 2023-09-27 RX ORDER — SODIUM CHLORIDE 9 MG/ML
1000 INJECTION, SOLUTION INTRAVENOUS
Refills: 0 | Status: DISCONTINUED | OUTPATIENT
Start: 2023-09-27 | End: 2023-09-28

## 2023-09-27 RX ORDER — DIATRIZOATE MEGLUMINE 180 MG/ML
30 INJECTION, SOLUTION INTRAVESICAL ONCE
Refills: 0 | Status: COMPLETED | OUTPATIENT
Start: 2023-09-27 | End: 2023-09-27

## 2023-09-27 RX ORDER — ACETAMINOPHEN 500 MG
1000 TABLET ORAL ONCE
Refills: 0 | Status: DISCONTINUED | OUTPATIENT
Start: 2023-09-27 | End: 2023-09-29

## 2023-09-27 RX ORDER — POTASSIUM PHOSPHATE, MONOBASIC POTASSIUM PHOSPHATE, DIBASIC 236; 224 MG/ML; MG/ML
30 INJECTION, SOLUTION INTRAVENOUS ONCE
Refills: 0 | Status: COMPLETED | OUTPATIENT
Start: 2023-09-27 | End: 2023-09-27

## 2023-09-27 RX ADMIN — HEPARIN SODIUM 5000 UNIT(S): 5000 INJECTION INTRAVENOUS; SUBCUTANEOUS at 15:29

## 2023-09-27 RX ADMIN — HEPARIN SODIUM 5000 UNIT(S): 5000 INJECTION INTRAVENOUS; SUBCUTANEOUS at 05:00

## 2023-09-27 RX ADMIN — PANTOPRAZOLE SODIUM 40 MILLIGRAM(S): 20 TABLET, DELAYED RELEASE ORAL at 12:29

## 2023-09-27 RX ADMIN — SODIUM CHLORIDE 130 MILLILITER(S): 9 INJECTION, SOLUTION INTRAVENOUS at 21:00

## 2023-09-27 RX ADMIN — PIPERACILLIN AND TAZOBACTAM 25 GRAM(S): 4; .5 INJECTION, POWDER, LYOPHILIZED, FOR SOLUTION INTRAVENOUS at 05:00

## 2023-09-27 RX ADMIN — DIATRIZOATE MEGLUMINE 30 MILLILITER(S): 180 INJECTION, SOLUTION INTRAVESICAL at 12:28

## 2023-09-27 RX ADMIN — POTASSIUM PHOSPHATE, MONOBASIC POTASSIUM PHOSPHATE, DIBASIC 83.33 MILLIMOLE(S): 236; 224 INJECTION, SOLUTION INTRAVENOUS at 12:27

## 2023-09-27 RX ADMIN — PIPERACILLIN AND TAZOBACTAM 25 GRAM(S): 4; .5 INJECTION, POWDER, LYOPHILIZED, FOR SOLUTION INTRAVENOUS at 15:28

## 2023-09-27 RX ADMIN — HEPARIN SODIUM 5000 UNIT(S): 5000 INJECTION INTRAVENOUS; SUBCUTANEOUS at 21:00

## 2023-09-27 RX ADMIN — PIPERACILLIN AND TAZOBACTAM 25 GRAM(S): 4; .5 INJECTION, POWDER, LYOPHILIZED, FOR SOLUTION INTRAVENOUS at 21:00

## 2023-09-27 NOTE — PROGRESS NOTE ADULT - ASSESSMENT
53 yr old male pmh of GERD who presented to [facility] ED after 1x day hx(Since 9/23) of severe RLQ abdominal pain, nausea, vomiting, chills and fever and labs significant for a WBC of 23.75 and CTAP of dilated 2.1cm appendix w/phlegmonous character consistent with a diagnosis of acute appendicitis.    #Acute appendicitis  #Post op state  - continuing on zosyn, leukocytosis continues to improve  - did have a temperature of 100.7 yesterday evening  - follow up cultures  - reiterated importance of using incentive spirometer.   - pain, dvt prophylaxis and diet per primary team    #Thrombocytopenia  - improving, likely in setting of acute appendicitis    #Elevated bilirubin  - GI following, levels improving today    #Hypomagnesemia  Replete    #Hypophosphatemia  Replete    DVT ppx; per primary team  Discussed with primary team     35 minutes spent on this encounter, including face to face with patient, care coordination and documentation.  Plan of care discussed with surgery team.    53 yr old male pmh of GERD who presented to [facility] ED after 1x day hx(Since 9/23) of severe RLQ abdominal pain, nausea, vomiting, chills and fever and labs significant for a WBC of 23.75 and CTAP of dilated 2.1cm appendix w/phlegmonous character consistent with a diagnosis of acute appendicitis.    #Acute appendicitis  #Post op state  - continuing on zosyn, leukocytosis continues to improve  - did have a temperature of 100.7 yesterday evening  - follow up cultures:   Numerous Escherichia coli  Numerous Pseudomonas aeruginosa  Rare Klebsiella pneumoniae  Few Streptococcus constellatus  Moderate Streptococcus anginosus  Mixed anaerobic melony including:  Moderate Bacteroides ovatus group  Numerous Bacteroides vulgatus group  Numerous Parabacteroides species  Few Parabacteroides species #2  Few Fusobacterium nucleatum  Few Clostridium species  Numerous Gram Positive Cocci identified as Ruminococcus sp.  Numerous Bacteroides stercoris group  Culture in progress  - due to significant polymicrobial infection, would recommend ID consultation to assist with antibiotic treatment.  ?switch treatment to meropenem and vancomycin  - reiterated importance of using incentive spirometer.   - pain, dvt prophylaxis and diet per primary team    #Thrombocytopenia  - improving, likely in setting of acute appendicitis    #Elevated bilirubin  - GI following, levels improving today    #Hypomagnesemia  Replete    #Hypophosphatemia  Replete    DVT ppx; per primary team  Discussed with primary team     35 minutes spent on this encounter, including face to face with patient, care coordination and documentation.  Plan of care discussed with surgery team.

## 2023-09-27 NOTE — PROGRESS NOTE ADULT - ASSESSMENT
Patient is a 53 yr old male pmh of GERD who presented to [facility] ED after 1x day hx(Since 9/23) of severe RLQ abdominal pain, nausea, vomiting, chills and fever and labs significant for a WBC of 23.75 and CTAP of dilated 2.1cm appendix w/phlegmonous character consistent with a diagnosis of acute appendicitis. He is now s/p laparoscopic appendectomy on 9/25    NPO/IVF  Pain/Nausea PRN  Zosyn  Dipesh, possible dc  SCD/SQH  19 fr neto  AM labs

## 2023-09-28 LAB
-  AMPICILLIN/SULBACTAM: SIGNIFICANT CHANGE UP
-  AMPICILLIN: SIGNIFICANT CHANGE UP
-  CEFAZOLIN: SIGNIFICANT CHANGE UP
-  CEFTRIAXONE: SIGNIFICANT CHANGE UP
-  CIPROFLOXACIN: SIGNIFICANT CHANGE UP
-  ERTAPENEM: SIGNIFICANT CHANGE UP
-  GENTAMICIN: SIGNIFICANT CHANGE UP
-  PIPERACILLIN/TAZOBACTAM: SIGNIFICANT CHANGE UP
-  TOBRAMYCIN: SIGNIFICANT CHANGE UP
-  TRIMETHOPRIM/SULFAMETHOXAZOLE: SIGNIFICANT CHANGE UP
ANION GAP SERPL CALC-SCNC: 9 MMOL/L — SIGNIFICANT CHANGE UP (ref 5–17)
BUN SERPL-MCNC: 22 MG/DL — SIGNIFICANT CHANGE UP (ref 7–23)
CALCIUM SERPL-MCNC: 8.6 MG/DL — SIGNIFICANT CHANGE UP (ref 8.4–10.5)
CHLORIDE SERPL-SCNC: 106 MMOL/L — SIGNIFICANT CHANGE UP (ref 96–108)
CO2 SERPL-SCNC: 25 MMOL/L — SIGNIFICANT CHANGE UP (ref 22–31)
CREAT SERPL-MCNC: 0.96 MG/DL — SIGNIFICANT CHANGE UP (ref 0.5–1.3)
EGFR: 95 ML/MIN/1.73M2 — SIGNIFICANT CHANGE UP
GLUCOSE SERPL-MCNC: 100 MG/DL — HIGH (ref 70–99)
HCT VFR BLD CALC: 41.5 % — SIGNIFICANT CHANGE UP (ref 39–50)
HGB BLD-MCNC: 13.7 G/DL — SIGNIFICANT CHANGE UP (ref 13–17)
MAGNESIUM SERPL-MCNC: 2 MG/DL — SIGNIFICANT CHANGE UP (ref 1.6–2.6)
MCHC RBC-ENTMCNC: 31.7 PG — SIGNIFICANT CHANGE UP (ref 27–34)
MCHC RBC-ENTMCNC: 33 GM/DL — SIGNIFICANT CHANGE UP (ref 32–36)
MCV RBC AUTO: 96.1 FL — SIGNIFICANT CHANGE UP (ref 80–100)
METHOD TYPE: SIGNIFICANT CHANGE UP
NRBC # BLD: 0 /100 WBCS — SIGNIFICANT CHANGE UP (ref 0–0)
PHOSPHATE SERPL-MCNC: 2.4 MG/DL — LOW (ref 2.5–4.5)
PLATELET # BLD AUTO: 172 K/UL — SIGNIFICANT CHANGE UP (ref 150–400)
POTASSIUM SERPL-MCNC: 3.9 MMOL/L — SIGNIFICANT CHANGE UP (ref 3.5–5.3)
POTASSIUM SERPL-SCNC: 3.9 MMOL/L — SIGNIFICANT CHANGE UP (ref 3.5–5.3)
RBC # BLD: 4.32 M/UL — SIGNIFICANT CHANGE UP (ref 4.2–5.8)
RBC # FLD: 12.4 % — SIGNIFICANT CHANGE UP (ref 10.3–14.5)
SODIUM SERPL-SCNC: 140 MMOL/L — SIGNIFICANT CHANGE UP (ref 135–145)
WBC # BLD: 8.47 K/UL — SIGNIFICANT CHANGE UP (ref 3.8–10.5)
WBC # FLD AUTO: 8.47 K/UL — SIGNIFICANT CHANGE UP (ref 3.8–10.5)

## 2023-09-28 PROCEDURE — 99232 SBSQ HOSP IP/OBS MODERATE 35: CPT

## 2023-09-28 RX ORDER — POTASSIUM PHOSPHATE, MONOBASIC POTASSIUM PHOSPHATE, DIBASIC 236; 224 MG/ML; MG/ML
15 INJECTION, SOLUTION INTRAVENOUS ONCE
Refills: 0 | Status: COMPLETED | OUTPATIENT
Start: 2023-09-28 | End: 2023-09-28

## 2023-09-28 RX ADMIN — PIPERACILLIN AND TAZOBACTAM 25 GRAM(S): 4; .5 INJECTION, POWDER, LYOPHILIZED, FOR SOLUTION INTRAVENOUS at 05:00

## 2023-09-28 RX ADMIN — HEPARIN SODIUM 5000 UNIT(S): 5000 INJECTION INTRAVENOUS; SUBCUTANEOUS at 14:49

## 2023-09-28 RX ADMIN — PIPERACILLIN AND TAZOBACTAM 25 GRAM(S): 4; .5 INJECTION, POWDER, LYOPHILIZED, FOR SOLUTION INTRAVENOUS at 14:49

## 2023-09-28 RX ADMIN — SODIUM CHLORIDE 130 MILLILITER(S): 9 INJECTION, SOLUTION INTRAVENOUS at 14:37

## 2023-09-28 RX ADMIN — POTASSIUM PHOSPHATE, MONOBASIC POTASSIUM PHOSPHATE, DIBASIC 62.5 MILLIMOLE(S): 236; 224 INJECTION, SOLUTION INTRAVENOUS at 11:27

## 2023-09-28 RX ADMIN — HEPARIN SODIUM 5000 UNIT(S): 5000 INJECTION INTRAVENOUS; SUBCUTANEOUS at 05:00

## 2023-09-28 RX ADMIN — PIPERACILLIN AND TAZOBACTAM 25 GRAM(S): 4; .5 INJECTION, POWDER, LYOPHILIZED, FOR SOLUTION INTRAVENOUS at 23:47

## 2023-09-28 RX ADMIN — PANTOPRAZOLE SODIUM 40 MILLIGRAM(S): 20 TABLET, DELAYED RELEASE ORAL at 11:27

## 2023-09-28 RX ADMIN — HEPARIN SODIUM 5000 UNIT(S): 5000 INJECTION INTRAVENOUS; SUBCUTANEOUS at 22:59

## 2023-09-28 NOTE — PHYSICAL THERAPY INITIAL EVALUATION ADULT - PERTINENT HX OF CURRENT PROBLEM, REHAB EVAL
Patient is a 53 yr old male pmh of GERD who presented to [facility] ED after 1x day hx(Since 9/23) of severe RLQ abdominal pain, nausea, vomiting, chills and fever. Patient states he had been experiencing a bit of constipation earlier during the day of 9/23 for which he trialed a fleet enema and was successful in having a BM. Overnight he became constipated and felt bloated again and around 10 pm felt severe sharp stabbing pain in the RLQ . Per patient the pain does not radiate anywhere and has been constant with waxing and waning in severity since onset. He has had 4 episodes of NBNB emesis during the day of 9/24 and fever of 100.9 at home. Denies melena, hematochezia, hematuria, change in urinary function, dysuria, d/c, flank pain, HA, dizziness, focal weakness, CP, and SOB.    In the [facility] ED patient VSS were stable and abdominal exam was, soft, ND, RLQ TTP with guarding, no rebound. His Labs were significant for a wbc 23.75, hgb 14.7. CTAP w/ IV contrast revealed a dilated appendix up to 2.1 cm (2:98), contains fluid and fecal material, and demonstrates pronounced surrounding inflammatory changes consistent with acute appendicitis. Patient has not eaten or drank since 5 pm 9/24. He was made NPO and Started on IVF and given 1 dose of zosyn in ED.

## 2023-09-28 NOTE — DIETITIAN INITIAL EVALUATION ADULT - PERTINENT MEDS FT
MEDICATIONS  (STANDING):  dextrose 5% + sodium chloride 0.45%. 1000 milliLiter(s) (130 mL/Hr) IV Continuous <Continuous>  heparin   Injectable 5000 Unit(s) SubCutaneous every 8 hours  pantoprazole  Injectable 40 milliGRAM(s) IV Push daily  piperacillin/tazobactam IVPB.. 3.375 Gram(s) IV Intermittent every 8 hours    MEDICATIONS  (PRN):  acetaminophen   IVPB .. 1000 milliGRAM(s) IV Intermittent once PRN Mild Pain (1 - 3), Moderate Pain (4 - 6), Severe Pain (7 - 10)  metoclopramide Injectable 10 milliGRAM(s) IV Push two times a day PRN nausea  ondansetron Injectable 4 milliGRAM(s) IV Push every 6 hours PRN Nausea and/or Vomiting

## 2023-09-28 NOTE — DIETITIAN INITIAL EVALUATION ADULT - NSFNSGIIOFT_GEN_A_CORE
09-27-23 @ 07:01  -  09-28-23 @ 07:00  --------------------------------------------------------  OUT:    Nasogastric/Oral tube (mL): 400 mL  Total OUT: 400 mL    Total NET: -400 mL      09-28-23 @ 07:01  -  09-28-23 @ 12:17  --------------------------------------------------------  OUT:    Nasogastric/Oral tube (mL): 0 mL  Total OUT: 0 mL    Total NET: 0 mL

## 2023-09-28 NOTE — DIETITIAN INITIAL EVALUATION ADULT - OTHER INFO
Patient is a 53 yr old male PMHx of GERD who presented to [facility] ED after 1x day hx (Since 9/23) of severe RLQ abdominal pain, nausea, vomiting, chills and fever and labs significant for a WBC of 23.75 and CTAP of dilated 2.1cm appendix w/phlegmonous character consistent with a diagnosis of acute appendicitis. He is now s/p laparoscopic appendectomy on 9/25.     Pt seen this AM on 8LA. Current diet NPO w chewing gum. Pt reports regular diet PTA with no c/o difficulty or swallowing. PTA pt with healthy appetite and wt stable at 195-200#. Labs significant for phos 2.4L. NKFA. Pt denies pain. Lai scale 18; no pressure ulcers; surgical incision x2 lap sites.   Patient is a 53 yr old male PMHx of GERD who presented to [facility] ED after 1x day hx (Since 9/23) of severe RLQ abdominal pain, nausea, vomiting, chills and fever and labs significant for a WBC of 23.75 and CTAP of dilated 2.1cm appendix w/phlegmonous character consistent with a diagnosis of acute appendicitis. He is now s/p laparoscopic appendectomy on 9/25.     Pt seen this AM on 8LA. Current diet NPO with chewing gum. Pt reports regular diet PTA with no c/o difficulty or swallowing. PTA Pt with healthy appetite and wt stable at 195-200 pounds. Labs significant for phos 2.4L. NKFA. Pt denies pain. Lai scale 18; no pressure ulcers; surgical incision x2 lap sites.

## 2023-09-28 NOTE — PROGRESS NOTE ADULT - ASSESSMENT
53 yr old male pmh of GERD who presented to [facility] ED after 1x day hx(Since 9/23) of severe RLQ abdominal pain, nausea, vomiting, chills and fever and labs significant for a WBC of 23.75 and CTAP of dilated 2.1cm appendix w/phlegmonous character consistent with a diagnosis of acute appendicitis.    #Acute appendicitis  #Post op state  Clinically improving, leucocytosis resolved. Continue on Pip/Tazo, follow-up cultures  Pain management, diet advancement per primary team  IS, OOB   -  #Thrombocytopenia  - improving, likely reactive     #Elevated bilirubin  - GI following, levels improving today    #Hypomagnesemia  Replete    #Hypophosphatemia  Replete    DVT ppx; SQH  Discussed with primary team

## 2023-09-28 NOTE — DIETITIAN INITIAL EVALUATION ADULT - ADD RECOMMEND
Monitor PO intake/appetite, GI distress, diet tolerance, weights  Honor food preferences as able  RD to remain available for additional nutrition interventions as needed

## 2023-09-28 NOTE — PROGRESS NOTE ADULT - ASSESSMENT
Patient is a 53 yr old male pmh of GERD who presented to [facility] ED after 1x day hx(Since 9/23) of severe RLQ abdominal pain, nausea, vomiting, chills and fever and labs significant for a WBC of 23.75 and CTAP of dilated 2.1cm appendix w/phlegmonous character consistent with a diagnosis of acute appendicitis. He is now s/p laparoscopic appendectomy on 9/25    NPO/IVF  Pain/Nausea PRN  Zosyn  SCD/SQH  19 fr neot  AM labs  dc NGT this AM

## 2023-09-28 NOTE — DIETITIAN INITIAL EVALUATION ADULT - PERTINENT LABORATORY DATA
09-28    140  |  106  |  22  ----------------------------<  100<H>  3.9   |  25  |  0.96    Ca    8.6      28 Sep 2023 07:21  Phos  2.4     09-28  Mg     2.0     09-28    TPro  5.5<L>  /  Alb  2.8<L>  /  TBili  1.2  /  DBili  0.4<H>  /  AST  9<L>  /  ALT  11  /  AlkPhos  40  09-27

## 2023-09-28 NOTE — DIETITIAN INITIAL EVALUATION ADULT - OTHER CALCULATIONS
Wt 90.7 kg; Ht 182.9 cm; BMI 27.1 overweight   +/- 10%; % %  Using ABW for calculations as pt WNL  Adj for surgery, current medical conditions Wt 90.7 kg; Ht 182.9 cm; BMI 27.1 overweight   +/- 10%; % %  Using ABW for calculations as pt WNL  Adjusted for surgery, current medical conditions

## 2023-09-29 VITALS — TEMPERATURE: 98 F

## 2023-09-29 DIAGNOSIS — K35.32 ACUTE APPENDICITIS WITH PERFORATION, LOCALIZED PERITONITIS, AND GANGRENE, WITHOUT ABSCESS: ICD-10-CM

## 2023-09-29 LAB
-  CEFTRIAXONE: SIGNIFICANT CHANGE UP
-  CLINDAMYCIN: SIGNIFICANT CHANGE UP
-  ERYTHROMYCIN: SIGNIFICANT CHANGE UP
-  LEVOFLOXACIN: SIGNIFICANT CHANGE UP
-  PENICILLIN: SIGNIFICANT CHANGE UP
ANION GAP SERPL CALC-SCNC: 9 MMOL/L — SIGNIFICANT CHANGE UP (ref 5–17)
BUN SERPL-MCNC: 16 MG/DL — SIGNIFICANT CHANGE UP (ref 7–23)
CALCIUM SERPL-MCNC: 8.2 MG/DL — LOW (ref 8.4–10.5)
CHLORIDE SERPL-SCNC: 105 MMOL/L — SIGNIFICANT CHANGE UP (ref 96–108)
CO2 SERPL-SCNC: 26 MMOL/L — SIGNIFICANT CHANGE UP (ref 22–31)
CREAT SERPL-MCNC: 1.04 MG/DL — SIGNIFICANT CHANGE UP (ref 0.5–1.3)
CULTURE RESULTS: SIGNIFICANT CHANGE UP
EGFR: 86 ML/MIN/1.73M2 — SIGNIFICANT CHANGE UP
GLUCOSE SERPL-MCNC: 105 MG/DL — HIGH (ref 70–99)
HCT VFR BLD CALC: 40.5 % — SIGNIFICANT CHANGE UP (ref 39–50)
HGB BLD-MCNC: 13.6 G/DL — SIGNIFICANT CHANGE UP (ref 13–17)
MAGNESIUM SERPL-MCNC: 1.8 MG/DL — SIGNIFICANT CHANGE UP (ref 1.6–2.6)
MCHC RBC-ENTMCNC: 31.6 PG — SIGNIFICANT CHANGE UP (ref 27–34)
MCHC RBC-ENTMCNC: 33.6 GM/DL — SIGNIFICANT CHANGE UP (ref 32–36)
MCV RBC AUTO: 94 FL — SIGNIFICANT CHANGE UP (ref 80–100)
METHOD TYPE: SIGNIFICANT CHANGE UP
METHOD TYPE: SIGNIFICANT CHANGE UP
NRBC # BLD: 0 /100 WBCS — SIGNIFICANT CHANGE UP (ref 0–0)
ORGANISM # SPEC MICROSCOPIC CNT: SIGNIFICANT CHANGE UP
PHOSPHATE SERPL-MCNC: 2.5 MG/DL — SIGNIFICANT CHANGE UP (ref 2.5–4.5)
PLATELET # BLD AUTO: 198 K/UL — SIGNIFICANT CHANGE UP (ref 150–400)
POTASSIUM SERPL-MCNC: 3.3 MMOL/L — LOW (ref 3.5–5.3)
POTASSIUM SERPL-SCNC: 3.3 MMOL/L — LOW (ref 3.5–5.3)
RBC # BLD: 4.31 M/UL — SIGNIFICANT CHANGE UP (ref 4.2–5.8)
RBC # FLD: 12.3 % — SIGNIFICANT CHANGE UP (ref 10.3–14.5)
SODIUM SERPL-SCNC: 140 MMOL/L — SIGNIFICANT CHANGE UP (ref 135–145)
SPECIMEN SOURCE: SIGNIFICANT CHANGE UP
WBC # BLD: 8.51 K/UL — SIGNIFICANT CHANGE UP (ref 3.8–10.5)
WBC # FLD AUTO: 8.51 K/UL — SIGNIFICANT CHANGE UP (ref 3.8–10.5)

## 2023-09-29 PROCEDURE — 85610 PROTHROMBIN TIME: CPT

## 2023-09-29 PROCEDURE — 71045 X-RAY EXAM CHEST 1 VIEW: CPT

## 2023-09-29 PROCEDURE — 84100 ASSAY OF PHOSPHORUS: CPT

## 2023-09-29 PROCEDURE — 87075 CULTR BACTERIA EXCEPT BLOOD: CPT

## 2023-09-29 PROCEDURE — 88305 TISSUE EXAM BY PATHOLOGIST: CPT

## 2023-09-29 PROCEDURE — 86900 BLOOD TYPING SEROLOGIC ABO: CPT

## 2023-09-29 PROCEDURE — 86850 RBC ANTIBODY SCREEN: CPT

## 2023-09-29 PROCEDURE — 88304 TISSUE EXAM BY PATHOLOGIST: CPT

## 2023-09-29 PROCEDURE — 81003 URINALYSIS AUTO W/O SCOPE: CPT

## 2023-09-29 PROCEDURE — 74019 RADEX ABDOMEN 2 VIEWS: CPT

## 2023-09-29 PROCEDURE — 80048 BASIC METABOLIC PNL TOTAL CA: CPT

## 2023-09-29 PROCEDURE — 74177 CT ABD & PELVIS W/CONTRAST: CPT

## 2023-09-29 PROCEDURE — 82962 GLUCOSE BLOOD TEST: CPT

## 2023-09-29 PROCEDURE — 99285 EMERGENCY DEPT VISIT HI MDM: CPT | Mod: 25

## 2023-09-29 PROCEDURE — 85730 THROMBOPLASTIN TIME PARTIAL: CPT

## 2023-09-29 PROCEDURE — 86901 BLOOD TYPING SEROLOGIC RH(D): CPT

## 2023-09-29 PROCEDURE — 87186 SC STD MICRODIL/AGAR DIL: CPT

## 2023-09-29 PROCEDURE — 85027 COMPLETE CBC AUTOMATED: CPT

## 2023-09-29 PROCEDURE — 87070 CULTURE OTHR SPECIMN AEROBIC: CPT

## 2023-09-29 PROCEDURE — 80076 HEPATIC FUNCTION PANEL: CPT

## 2023-09-29 PROCEDURE — 85025 COMPLETE CBC W/AUTO DIFF WBC: CPT

## 2023-09-29 PROCEDURE — 96374 THER/PROPH/DIAG INJ IV PUSH: CPT

## 2023-09-29 PROCEDURE — 36415 COLL VENOUS BLD VENIPUNCTURE: CPT

## 2023-09-29 PROCEDURE — 87181 SC STD AGAR DILUTION PER AGT: CPT

## 2023-09-29 PROCEDURE — 88112 CYTOPATH CELL ENHANCE TECH: CPT

## 2023-09-29 PROCEDURE — 96372 THER/PROPH/DIAG INJ SC/IM: CPT

## 2023-09-29 PROCEDURE — 80053 COMPREHEN METABOLIC PANEL: CPT

## 2023-09-29 PROCEDURE — 83735 ASSAY OF MAGNESIUM: CPT

## 2023-09-29 PROCEDURE — 99232 SBSQ HOSP IP/OBS MODERATE 35: CPT

## 2023-09-29 PROCEDURE — 83605 ASSAY OF LACTIC ACID: CPT

## 2023-09-29 PROCEDURE — 97161 PT EVAL LOW COMPLEX 20 MIN: CPT

## 2023-09-29 PROCEDURE — 87184 SC STD DISK METHOD PER PLATE: CPT

## 2023-09-29 PROCEDURE — 83690 ASSAY OF LIPASE: CPT

## 2023-09-29 RX ORDER — METRONIDAZOLE 500 MG
1 TABLET ORAL
Qty: 21 | Refills: 0
Start: 2023-09-29 | End: 2023-10-05

## 2023-09-29 RX ORDER — SODIUM,POTASSIUM PHOSPHATES 278-250MG
1 POWDER IN PACKET (EA) ORAL ONCE
Refills: 0 | Status: COMPLETED | OUTPATIENT
Start: 2023-09-29 | End: 2023-09-29

## 2023-09-29 RX ORDER — METRONIDAZOLE 500 MG
1 TABLET ORAL
Qty: 7 | Refills: 0
Start: 2023-09-29 | End: 2023-10-05

## 2023-09-29 RX ORDER — LEVOFLOXACIN 5 MG/ML
1 INJECTION, SOLUTION INTRAVENOUS
Qty: 7 | Refills: 0
Start: 2023-09-29 | End: 2023-10-05

## 2023-09-29 RX ORDER — INFLUENZA VIRUS VACCINE 15; 15; 15; 15 UG/.5ML; UG/.5ML; UG/.5ML; UG/.5ML
0.5 SUSPENSION INTRAMUSCULAR ONCE
Refills: 0 | Status: DISCONTINUED | OUTPATIENT
Start: 2023-09-29 | End: 2023-09-29

## 2023-09-29 RX ADMIN — HEPARIN SODIUM 5000 UNIT(S): 5000 INJECTION INTRAVENOUS; SUBCUTANEOUS at 13:31

## 2023-09-29 RX ADMIN — PANTOPRAZOLE SODIUM 40 MILLIGRAM(S): 20 TABLET, DELAYED RELEASE ORAL at 12:12

## 2023-09-29 RX ADMIN — PIPERACILLIN AND TAZOBACTAM 25 GRAM(S): 4; .5 INJECTION, POWDER, LYOPHILIZED, FOR SOLUTION INTRAVENOUS at 06:15

## 2023-09-29 RX ADMIN — PIPERACILLIN AND TAZOBACTAM 25 GRAM(S): 4; .5 INJECTION, POWDER, LYOPHILIZED, FOR SOLUTION INTRAVENOUS at 13:30

## 2023-09-29 RX ADMIN — Medication 1 PACKET(S): at 12:12

## 2023-09-29 RX ADMIN — HEPARIN SODIUM 5000 UNIT(S): 5000 INJECTION INTRAVENOUS; SUBCUTANEOUS at 06:13

## 2023-09-29 NOTE — DISCHARGE NOTE PROVIDER - NSDCDCMDCOMP_GEN_ALL_CORE
Weight management plan: Patient was referred to their PCP to discuss a diet and exercise plan.  We wish you continued good healing. If you have any questions or concerns, please do not hesitate to contact us at [phone]    Please remember to call and schedule a follow up appointment if one was recommended at your earliest convenience.   PODIATRY CLINIC HOURS  TELEPHONE NUMBER    Dr. Juan J Bustillos D.P.[facility]    Clinics:  [facility]    [place] [facility]   Tuesday 1PM-6PM  [place]  Wednesday 7AM-2PM  [facility]  Thursday 10AM-6PM  [place]  Friday 7AM-3PM  [place]  Specialty schedulers:   [phone] to make an appointment with any Specialty Provider.        Urgent Care locations:    [facility] Monday-Friday 5 pm - 9 pm. Saturday-Sunday 9 am -5pm    Monday-Friday 11 am - 9 pm Saturday 9 am - 5 pm     Monday-Sunday 12 noon-8PM [phone]     If you need a medication refill, please contact us you may need lab work and/or a follow up visit prior to your refill (i.e. Antifungal medications).    [employer] (secure e-mail communication and access to your chart) to send a message or to make an appointment.    If MRI needed please call [facility] Eckert at [phone]           This document is complete and the patient is ready for discharge.

## 2023-09-29 NOTE — DISCHARGE NOTE PROVIDER - NSDCFUADDINST_GEN_ALL_CORE_FT
General Discharge Instructions:  Please resume all regular home medications unless specifically advised not to take a particular medication. Also, please take any new medications as prescribed.  Please get plenty of rest, continue to ambulate several times per day, and drink adequate amounts of fluids. Avoid lifting weights greater than 5-10 lbs until you follow-up with your surgeon, who will instruct you further regarding activity restrictions.  Avoid driving or operating heavy machinery while taking pain medications.  Please follow-up with your surgeon and Primary Care Provider (PCP) as advised.  Incision Care:  *Please call your doctor if you have increased pain, swelling, redness, or drainage from the incision site.  *Avoid swimming and baths until your follow-up appointment.  *You may shower, and wash surgical incisions with a mild soap and warm water. Gently pat the area dry.  Warning Signs:  Please call your doctor if you experience the following:  *You experience new chest pain, pressure, squeezing or tightness.  *New or worsening cough, shortness of breath, or wheeze.  *If you are vomiting and cannot keep down fluids or your medications.  *You are getting dehydrated due to continued vomiting, diarrhea, or other reasons. Signs of dehydration include dry mouth, rapid heartbeat, or feeling dizzy or faint when standing.  *You see blood or dark/black material when you vomit or have a bowel movement.  *You experience burning when you urinate, have blood in your urine, or experience a discharge.  *Your pain is not improving within 8-12 hours or is not gone within 24 hours. Call or return immediately if your pain is getting worse, changes location, or moves to your chest or back.  *You have shaking chills, or fever greater than 101.5 degrees Fahrenheit or 38 degrees Celsius.  *Any change in your symptoms, or any new symptoms that concern you.   General Discharge Instructions:  Please resume all regular home medications unless specifically advised not to take a particular medication. Also, please take any new medications as prescribed.  Please get plenty of rest, continue to ambulate several times per day, and drink adequate amounts of fluids. Avoid lifting weights greater than 5-10 lbs until you follow-up with your surgeon, who will instruct you further regarding activity restrictions.  Avoid driving or operating heavy machinery while taking pain medications.  Please follow-up with your surgeon and Primary Care Provider (PCP) as advised.  Incision Care:  *Please call your doctor if you have increased pain, swelling, redness, or drainage from the incision site.  *Avoid swimming and baths until your follow-up appointment.  *You may shower, and wash surgical incisions with a mild soap and warm water. Gently pat the area dry.  Warning Signs:  Please call your doctor if you experience the following:  *You experience new chest pain, pressure, squeezing or tightness.  *New or worsening cough, shortness of breath, or wheeze.  *If you are vomiting and cannot keep down fluids or your medications.  *You are getting dehydrated due to continued vomiting, diarrhea, or other reasons. Signs of dehydration include dry mouth, rapid heartbeat, or feeling dizzy or faint when standing.  *You see blood or dark/black material when you vomit or have a bowel movement.  *You experience burning when you urinate, have blood in your urine, or experience a discharge.  *Your pain is not improving within 8-12 hours or is not gone within 24 hours. Call or return immediately if your pain is getting worse, changes location, or moves to your chest or back.  *You have shaking chills, or fever greater than 101.5 degrees Fahrenheit or 38 degrees Celsius.  *Any change in your symptoms, or any new symptoms that concern you.    1. Take 2 extra strength Tylenol + 1 Advil = 3 tablets at the same time EVERY 6 hours standing  2. Take 1 Vicodin at bed time

## 2023-09-29 NOTE — CONSULT NOTE ADULT - ASSESSMENT
53 yr old male pmh of GERD who presented to [facility] ED after 1x day hx(Since 9/23) of severe RLQ abdominal pain, nausea, vomiting, chills and fever and labs significant for a WBC of 23.75 and CTAP of dilated 2.1cm appendix w/phlegmonous character consistent with a diagnosis of acute appendicitis.    Acute appendicitis  Post op state  On Pip/Tazo, WBC improving, follow-up OR cultures  Pain control, OOB, IS  Diet advancement per primary team    Thrombocytopenia  Probably reactive in setting of above  Monitor    Elevated bilirubin  Get Indirect bilirubin  Hb stable  Will manage as needed    Hypomagnesemia  Replete    DVT ppx; per primary team  Discussed with primary team 
53 yr old male pmh of GERD who presented to [facility] ED after 1x day hx(Since 9/23) of severe RLQ abdominal pain, nausea, vomiting, chills and fever and labs significant for a WBC of 23.75 and CTAP of dilated 2.1cm appendix w/phlegmonous character consistent with a diagnosis of acute appendicitis, s/p Lap appendectomy OR culture grew numerous E. coli  Numerous Pseudomonas aeruginosa, Rare Klebsiella pneumoniae  Few Streptococcus constellatus, Moderate Streptococcus anginosus, Mixed anaerobic melony including:Moderate Bacteroides ovatus group,Numerous Bacteroides vulgatus group  Numerous Parabacteroides species    
normal...

## 2023-09-29 NOTE — CONSULT NOTE ADULT - SUBJECTIVE AND OBJECTIVE BOX
HPI:      Patient is a 53 yr old male pmh of GERD who presented to [facility] ED after 1x day hx(Since 9/23) of severe RLQ abdominal pain, nausea, vomiting, chills and fever. Patient states he had been experiencing a bit of constipation earlier during the day of 9/23 for which he trialed a fleet enema and was successful in having a BM. Overnight he became constipated and felt bloated again and around 10 pm felt severe sharp stabbing pain in the RLQ . Per patient the pain does not radiate anywhere and has been constant with waxing and waning in severity since onset. He has had 4 episodes of NBNB emesis during the day of 9/24 and fever of 100.9 at home. Denies melena, hematochezia, hematuria, change in urinary function, dysuria, d/c, flank pain, HA, dizziness, focal weakness, CP, and SOB.    In the [facility] ED patient VSS were stable and abdominal exam was, soft, ND, RLQ TTP with guarding, no rebound. His Labs were significant for a wbc 23.75, hgb 14.7. CTAP w/ IV contrast revealed a dilated appendix up to 2.1 cm (2:98), contains fluid and fecal material, and demonstrates pronounced surrounding inflammatory changes consistent with acute appendicitis. Patient has not eaten or drank since 5 pm 9/24. He was made NPO and Started on IVF and given 1 dose of zosyn in ED. (25 Sep 2023 01:37)    Patient underwent Laparoscopic appendectomy on 25-Sep-2023 with Laparoscopic washout of abdominal cavity for Gangrenous appendicitis.      PAST MEDICAL & SURGICAL HISTORY:  GERD (gastroesophageal reflux disease)  H/O knee surgery  History of strabismus surgery            REVIEW OF SYSTEMS:    General: no weakness; no fevers, no chills  Skin/Breast: no rash  Respiratory and Thorax: no SOB, no cough  Cardiovascular:	No chest pain  Gastrointestinal:	 no nausea, vomiting , diarrhea  Genitourinary:	no dysuria, no difficulty urinating, no hematuria  Musculoskeletal:	no weakness, no joint swelling/pain  Neurological:	no focal weakness/numbness  Endocrine:no polyuria, no polydipsia      ANTIBIOTICS:  MEDICATIONS  (STANDING):  heparin   Injectable 5000 Unit(s) SubCutaneous every 8 hours  influenza   Vaccine 0.5 milliLiter(s) IntraMuscular once  pantoprazole  Injectable 40 milliGRAM(s) IV Push daily  piperacillin/tazobactam IVPB.. 3.375 Gram(s) IV Intermittent every 8 hours    MEDICATIONS  (PRN):  acetaminophen   IVPB .. 1000 milliGRAM(s) IV Intermittent once PRN Mild Pain (1 - 3), Moderate Pain (4 - 6), Severe Pain (7 - 10)  metoclopramide Injectable 10 milliGRAM(s) IV Push two times a day PRN nausea  ondansetron Injectable 4 milliGRAM(s) IV Push every 6 hours PRN Nausea and/or Vomiting    Allergies: No Known Allergies    SOCIAL HISTORY: no ETOH    FAMILY HISTORY:  Family history of prostate cancer in father (Father)        Vital Signs Last 24 Hrs  T(C): 36.3 (29 Sep 2023 13:58), Max: 36.9 (28 Sep 2023 21:45)  T(F): 97.4 (29 Sep 2023 13:58), Max: 98.5 (28 Sep 2023 21:45)  HR: 74 (29 Sep 2023 12:15) (72 - 82)  BP: 138/88 (29 Sep 2023 12:15) (133/79 - 146/80)  BP(mean): 105 (29 Sep 2023 12:15) (98 - 108)  RR: 18 (29 Sep 2023 12:15) (17 - 18)  SpO2: 94% (29 Sep 2023 12:15) (92% - 96%)    Parameters below as of 29 Sep 2023 12:15  Patient On (Oxygen Delivery Method): room air        09-28-23 @ 07:01  -  09-29-23 @ 07:00  --------------------------------------------------------  IN: 3085 mL / OUT: 1643 mL / NET: 1442 mL    09-29-23 @ 07:01 - 09-29-23 @ 15:56  --------------------------------------------------------  IN: 625 mL / OUT: 515 mL / NET: 110 mL        PHYSICAL EXAM:  Constitutional: Well-developed, well nourished  Eyes:SUELLEN, EOMI  Ear/Nose/Throat: no oral lesion, no sinus tenderness on percussion	  Neck:no JVD, no lymphadenopathy, supple  Respiratory: CTA elias  Cardiovascular: S1S2 RRR, no murmurs  Gastrointestinal: soft, (+) BS, (+) GETACHEW in lower abdomen, serous fluid  Extremities:no e/e/c  Vascular: DP Pulse: right normal; left normal      LABS:                        13.6   8.51  )-----------( 198      ( 29 Sep 2023 06:49 )             40.5     09-29    140  |  105  |  16  ----------------------------<  105<H>  3.3<L>   |  26  |  1.04    Ca    8.2<L>      29 Sep 2023 06:49  Phos  2.5     09-29  Mg     1.8     09-29        Urinalysis Basic - ( 29 Sep 2023 06:49 )    Color: x / Appearance: x / SG: x / pH: x  Gluc: 105 mg/dL / Ketone: x  / Bili: x / Urobili: x   Blood: x / Protein: x / Nitrite: x   Leuk Esterase: x / RBC: x / WBC x   Sq Epi: x / Non Sq Epi: x / Bacteria: x    MICROBIOLOGY:  Culture - Tissue with Gram Stain (09.25.23 @ 02:54)    Gram Stain:   Moderate WBC's  Numerous Gram positive cocci in pairs  Few Gram Positive Rods   -  Ampicillin: R >16 These ampicillin results predict results for amoxicillin   -  Ampicillin: R >16 These ampicillin results predict results for amoxicillin   -  Ampicillin/Sulbactam: S <=4/2   -  Ampicillin/Sulbactam: R >16/8   -  Aztreonam: S <=4   -  Cefazolin: S <=2   -  Cefazolin: R 8   -  Cefepime: S 4   -  Ceftriaxone: S 0.38   -  Ceftriaxone: S <=1   -  Ceftriaxone: S 0.047   -  Ceftriaxone: S <=1   -  Ciprofloxacin: R 1   -  Ciprofloxacin: S <=0.25   -  Ciprofloxacin: S <=0.25   -  Clindamycin: S   -  Clindamycin: S   -  Ertapenem: S <=0.5   -  Ertapenem: S <=0.5   -  Erythromycin: S   -  Erythromycin: S   -  Gentamicin: S <=2   -  Gentamicin: R >8   -  Levofloxacin: S 0.125   -  Levofloxacin: S 1   -  Levofloxacin: S <=0.5   -  Penicillin: S 0.064   -  Penicillin: S 0.032   -  Piperacillin/Tazobactam: S <=8   -  Piperacillin/Tazobactam: S <=8   -  Piperacillin/Tazobactam: S <=8   -  Tobramycin: I 8   -  Tobramycin: S   -  Tobramycin: S <=2   -  Trimethoprim/Sulfamethoxazole: S <=0.5/9.5   -  Trimethoprim/Sulfamethoxazole: R >2/38   -  Vancomycin: S   Specimen Source: .Tissue abdominal tissue for culture ## 2   Culture Results:   Numerous Escherichia coli  Numerous Pseudomonas aeruginosa  Rare Klebsiella pneumoniae  Few Streptococcus constellatus  Moderate Streptococcus anginosus  Mixed anaerobic melony including:  Moderate Bacteroides ovatus group  Numerous Bacteroides vulgatus group  Numerous Parabacteroides species  Few Fusobacterium nucleatum  Few Clostridium species  Numerous Bacteroides stercoris group   Organism Identification: Escherichia coli  Pseudomonas aeruginosa  Pseudomonas aeruginosa  Klebsiella pneumoniae  Streptococcus constellatus  Streptococcus constellatus  Streptococcus anginosus  Streptococcus anginosus   Organism: Escherichia coli   Organism: Pseudomonas aeruginosa   Organism: Pseudomonas aeruginosa   Organism: Klebsiella pneumoniae   Organism: Streptococcus constellatus   Organism: Streptococcus constellatus   Organism: Streptococcus anginosus   Organism: Streptococcus anginosus   Method Type: KB   Method Type: RORY   Method Type: RORY   Method Type: KB   Method Type: ETEST   Method Type: RORY   Method Type: ETEST   Method Type: KB    RADIOLOGY & ADDITIONAL STUDIES:    ACC: [id number] EXAM:  CT ABDOMEN AND PELVIS IC   ORDERED BY: LEE RICCI     PROCEDURE DATE:  09/24/2023          INTERPRETATION:  CLINICAL INFORMATION: R sided pain, N/V, fever.    COMPARISON: None.    CONTRAST/COMPLICATIONS:  IV Contrast: Omnipaque 350  100 cc administered   0 cc discarded  Oral Contrast: None  Complications: None reported at time of study completion    PROCEDURE:  CT of the Abdomen and Pelvis was performed.  Sagittal and coronal reformats were performed.    FINDINGS:  LOWER CHEST: Bibasilar dependent atelectasis. Few left lower lobe   micronodules.    LIVER: 1.2 x 0.7 cm ill-defined hypodense lesion in segment 8. Smaller   ill-defined hypodense lesion in segment 7.  BILE DUCTS: Normal caliber.  GALLBLADDER: Within normal limits.  SPLEEN: Two ill-defined enhancing lesions within the posterior spleen   measuring up to 1 cm.  PANCREAS: Within normal limits.  ADRENALS: Within normal limits.  KIDNEYS/URETERS: No hydronephrosis or nephrolithiasis. Small left renal   cyst.    BLADDER: Within normal limits.  REPRODUCTIVE ORGANS: Prostate mildly enlarged and protrudes into the   bladder floor.    BOWEL: Dilated appendix up to 2.1 cm (2:98), contains fluid and fecal   material, and demonstrates pronounced surrounding inflammatory changes   consistent with acute appendicitis. No extraluminal gas or organized   collection. No bowel obstruction. Descending and sigmoid diverticulosis.  PERITONEUM: No ascites.  VESSELS: Within normal limits.  RETROPERITONEUM/LYMPH NODES: No lymphadenopathy.  ABDOMINAL WALL: Within normal limits.  BONES: Severe degenerative disc disease at L5-S1. Benign sclerotic   densities within the left iliac bone and left intertrochanteric femur.    IMPRESSION:  1.  Acute appendicitis with surrounding phlegmonous change. No   extraluminal gas or organized collection.  2.  Two ill-defined hypodense lesions in the right hepatic lobe measuring   up to 1.2 cm, indeterminate. MRI of the abdomen with and without   gadolinium would be useful for further characterization of liver lesion.  3.  Two ill-defined enhancing lesions within the spleen measuring up to 1   cm, indeterminate. This can also be further evaluated on MRI.  
Patient is a 53 yr old male pmh of GERD who presented to [facility] ED after 1x day hx(Since 9/23) of severe RLQ abdominal pain, nausea, vomiting, chills and fever. Patient states he had been experiencing a bit of constipation earlier during the day of 9/23 for which he trialed a fleet enema and was successful in having a BM. Overnight he became constipated and felt bloated again and around 10 pm felt severe sharp stabbing pain in the RLQ . Per patient the pain does not radiate anywhere and has been constant with waxing and waning in severity since onset. He has had 4 episodes of NBNB emesis during the day of 9/24 and fever of 100.9 at home. Denies melena, hematochezia, hematuria, change in urinary function, dysuria, d/c, flank pain, HA, dizziness, focal weakness, CP, and SOB.    In the [facility] ED patient VSS were stable and abdominal exam was, soft, ND, RLQ TTP with guarding, no rebound. His Labs were significant for a wbc 23.75, hgb 14.7. CTAP w/ IV contrast revealed a dilated appendix up to 2.1 cm (2:98), contains fluid and fecal material, and demonstrates pronounced surrounding inflammatory changes consistent with acute appendicitis. Patient has not eaten or drank since 5 pm 9/24. He was made NPO and Started on IVF and given 1 dose of zosyn in ED. (25 Sep 2023 01:3    PMH GERD  Home meds: denies  Allergies: denies  Shx: denies  Family history: denies    INTERVAL EVENTS:  POD 1 lap appy     SUBJECTIVE:  Patient was seen and examined at bedside. Reports pain controlled. denies SOB, no chest pain, no N/V, no flatus or BM. No other complaints or events reported. Telemetry reviewed     Review of systems: No fever, chills, dizziness, HA, Changes in vision, CP, dyspnea, nausea or vomiting, dysuria, changes in bowel movements, LE edema. Rest of 12 point Review of systems negative unless otherwise documented elsewhere in note.     Diet, NPO (09-25-23 @ 02:53) [Active]      MEDICATIONS:  MEDICATIONS  (STANDING):  acetaminophen   IVPB .. 1000 milliGRAM(s) IV Intermittent once  heparin   Injectable 5000 Unit(s) SubCutaneous every 8 hours  lactated ringers. 1000 milliLiter(s) (125 mL/Hr) IV Continuous <Continuous>  piperacillin/tazobactam IVPB.. 3.375 Gram(s) IV Intermittent every 8 hours    MEDICATIONS  (PRN):  HYDROmorphone  Injectable 0.25 milliGRAM(s) IV Push every 4 hours PRN Moderate Pain (4 - 6)  HYDROmorphone  Injectable 0.5 milliGRAM(s) IV Push every 30 minutes PRN Severe Pain (7 - 10)  HYDROmorphone  Injectable 0.5 milliGRAM(s) IV Push every 4 hours PRN Severe Pain (7 - 10)  ondansetron Injectable 4 milliGRAM(s) IV Push every 6 hours PRN Nausea and/or Vomiting      Allergies    No Known Allergies    Intolerances        OBJECTIVE:  Vital Signs Last 24 Hrs  T(C): 36.9 (25 Sep 2023 09:28), Max: 37.3 (24 Sep 2023 20:46)  T(F): 98.4 (25 Sep 2023 09:28), Max: 99.1 (24 Sep 2023 20:46)  HR: 88 (25 Sep 2023 08:25) (73 - 100)  BP: 119/66 (25 Sep 2023 08:25) (113/61 - 152/76)  BP(mean): 85 (25 Sep 2023 08:25) (79 - 95)  RR: 18 (25 Sep 2023 05:01) (15 - 20)  SpO2: 93% (25 Sep 2023 08:25) (93% - 99%)    Parameters below as of 25 Sep 2023 08:25  Patient On (Oxygen Delivery Method): room air      I&O's Summary    24 Sep 2023 07:01  -  25 Sep 2023 07:00  --------------------------------------------------------  IN: 625 mL / OUT: 755 mL / NET: -130 mL    25 Sep 2023 07:01  -  25 Sep 2023 12:15  --------------------------------------------------------  IN: 125 mL / OUT: 0 mL / NET: 125 mL        PHYSICAL EXAM:  General: AOX3, NAD, lying in bed, speaking in full sentences, no labored breathing on RA  HEENT: AT/NC, no facial asymmetry   Lungs: poor inspiration, no crackles, no wheezes  Heart: RRR  Abdomen; soft, mild distension, no BS  Extremities: warm, no edema, no tenderness, no focal deficit     LABS:                        13.7   17.80 )-----------( 134      ( 25 Sep 2023 05:30 )             39.2     09-25    138  |  106  |  13  ----------------------------<  165<H>  4.3   |  24  |  1.14    Ca    8.7      25 Sep 2023 05:30  Phos  2.6     09-25  Mg     1.5     09-25    TPro  5.8<L>  /  Alb  3.3  /  TBili  2.3<H>  /  DBili  x   /  AST  9<L>  /  ALT  13  /  AlkPhos  42  09-25    LIVER FUNCTIONS - ( 25 Sep 2023 05:30 )  Alb: 3.3 g/dL / Pro: 5.8 g/dL / ALK PHOS: 42 U/L / ALT: 13 U/L / AST: 9 U/L / GGT: x           PT/INR - ( 24 Sep 2023 23:45 )   PT: 13.0 sec;   INR: 1.16          PTT - ( 24 Sep 2023 23:45 )  PTT:28.3 sec  CAPILLARY BLOOD GLUCOSE        Urinalysis Basic - ( 25 Sep 2023 05:30 )    Color: x / Appearance: x / SG: x / pH: x  Gluc: 165 mg/dL / Ketone: x  / Bili: x / Urobili: x   Blood: x / Protein: x / Nitrite: x   Leuk Esterase: x / RBC: x / WBC x   Sq Epi: x / Non Sq Epi: x / Bacteria: x        MICRODATA:    Culture - Tissue with Gram Stain (collected 25 Sep 2023 02:54)  Source: .Tissue abdominal tissue for culture ## 2  Gram Stain (25 Sep 2023 05:27):    Moderate WBC's    Numerous Gram positive cocci in pairs    Few Gram Positive Rods        RADIOLOGY/OTHER STUDIES:

## 2023-09-29 NOTE — DISCHARGE NOTE PROVIDER - CARE PROVIDER_API CALL
Yen Orr   Surgery  [address], [address]  [place] [address]  Phone: [phone]  Fax: [phone]  Follow Up Time:

## 2023-09-29 NOTE — PROGRESS NOTE ADULT - PROVIDER SPECIALTY LIST ADULT
Gastroenterology
Gastroenterology
Surgery
Gastroenterology
Hospitalist
Internal Medicine
Internal Medicine
Surgery
Gastroenterology
Gastroenterology
Hospitalist
Surgery

## 2023-09-29 NOTE — DISCHARGE NOTE NURSING/CASE MANAGEMENT/SOCIAL WORK - PATIENT PORTAL LINK FT
You can access the FollowMyHealth Patient Portal offered by [facility] by registering at the following website: [email or web address]. By joining [facility]’s FollowMyHealth portal, you will also be able to view your health information using other applications (apps) compatible with our system.

## 2023-09-29 NOTE — CONSULT NOTE ADULT - PROBLEM SELECTOR RECOMMENDATION 9
1) Patient was on Zosyn x 4 days  2) Upon discharge start Levofloxacin 500mg orally q24h and Metronidazole 500mg q8h x 7 -10 days

## 2023-09-29 NOTE — DISCHARGE NOTE NURSING/CASE MANAGEMENT/SOCIAL WORK - NSDCPEFALRISK_GEN_ALL_CORE
For information on Fall & Injury Prevention, visit: [email or web address] OR  [email or web address] OR  https://www.cdc.gov/steadi/patient.html

## 2023-09-29 NOTE — DISCHARGE NOTE PROVIDER - NSDCMRMEDTOKEN_GEN_ALL_CORE_FT
levoFLOXacin 500 mg oral tablet: 1 tab(s) orally every 24 hours MDD: 1 tab  metroNIDAZOLE 500 mg oral tablet: 1 tab(s) orally every 8 hours MDD: 3 tabs

## 2023-09-29 NOTE — PROGRESS NOTE ADULT - ASSESSMENT
53 yr old male pmh of GERD who presented to [facility] ED after 1x day hx(Since 9/23) of severe RLQ abdominal pain, nausea, vomiting, chills and fever and labs significant for a WBC of 23.75 and CTAP of dilated 2.1cm appendix w/phlegmonous character consistent with a diagnosis of acute appendicitis.    #Acute appendicitis  #Post op state  Clinically improving, leucocytosis resolved. Continue on Pip/Tazo, follow-up cultures.  Discharge antibiotic plan per surgery.   Pain management, diet advancement per primary team  IS, OOB     #Thrombocytopenia  - improving, likely reactive   - can have follow up labs drawn outpatient    #Elevated bilirubin  - GI following appreciate input    #Hypomagnesemia  Replete as needed    #Hypophosphatemia  Replete    DVT ppx; SQH  Discussed with primary team     35 minutes spent on this encounter, including face to face with patient, care coordination and documentation.

## 2023-09-29 NOTE — PROGRESS NOTE ADULT - ATTENDING COMMENTS
Abdomen distended, appropriately tender, wounds dry, clean, intact, GETACHEW serous, BS-, Flatus-, BM-, NGT,  NPO, IVF, IV ABX, DVT prophylaxis, Spirometry, F/U LABS, VS, F/U OR cultures. OOB to ambulate. ABD XR.
Abdomen soft, appropriately tender, wounds dry, clean, intact, GETACHEW serous, BS+,  Flatus+,  BM+, tolerating diet, IVF, IV ABX, DVT prophylaxis, Spirometry, F/U LABS, VS, OOB to ambulate. D/C GETACHEW, D/C home with F/U in the office.
Abdomen distended, appropriately tender, wounds dry, clean, intact, GETACHEW serous, BS+,  Flatus+,  BM+, D/C NGT, start clear liquid diet, IVF, IV ABX, DVT prophylaxis, Spirometry, F/U LABS, VS, F/U OR cultures. OOB to ambulate.
Abdomen distended, appropriately tender, wounds dry, clean, intact, GETACHEW serous, BS-, Flatus-, BM-, NPO, IVF, IV ABX, DVT prophylaxis, Spirometry, F/U LABS, VS, F/U OR cultures
Abdomen distended, appropriately tender, wounds dry, clean, intact, GETACHEW serous, BS-, Flatus-, BM-, NPO, IVF, IV ABX, DVT prophylaxis, Spirometry, F/U LABS, VS, F/U OR cultures. OOB to ambulate.

## 2023-09-29 NOTE — PROGRESS NOTE ADULT - SUBJECTIVE AND OBJECTIVE BOX
SUBJECTIVE:  Patient was seen and examined at bedside. Reports feeling ok, pain controlled. passing flatus, no BM. Having GERD symptoms. No other complaints or events reported. Telemetry reviewed     Review of systems: No fever, chills, dizziness, HA, Changes in vision, CP, dyspnea, nausea or vomiting, dysuria,  LE edema. Rest of 12 point Review of systems negative unless otherwise documented elsewhere in note.     Diet, Clear Liquid (09-26-23 @ 00:23) [Active]      MEDICATIONS:  MEDICATIONS  (STANDING):  famotidine    Tablet 20 milliGRAM(s) Oral daily  heparin   Injectable 5000 Unit(s) SubCutaneous every 8 hours  lactated ringers. 1000 milliLiter(s) (130 mL/Hr) IV Continuous <Continuous>  piperacillin/tazobactam IVPB.. 3.375 Gram(s) IV Intermittent every 8 hours  tamsulosin 0.4 milliGRAM(s) Oral at bedtime    MEDICATIONS  (PRN):  HYDROmorphone  Injectable 0.25 milliGRAM(s) IV Push every 4 hours PRN Moderate Pain (4 - 6)  HYDROmorphone  Injectable 0.5 milliGRAM(s) IV Push every 30 minutes PRN Severe Pain (7 - 10)  HYDROmorphone  Injectable 0.5 milliGRAM(s) IV Push every 4 hours PRN Severe Pain (7 - 10)  ondansetron Injectable 4 milliGRAM(s) IV Push every 6 hours PRN Nausea and/or Vomiting      Allergies    No Known Allergies    Intolerances        OBJECTIVE:  Vital Signs Last 24 Hrs  T(C): 36.9 (26 Sep 2023 09:11), Max: 37.1 (25 Sep 2023 18:00)  T(F): 98.4 (26 Sep 2023 09:11), Max: 98.8 (25 Sep 2023 18:00)  HR: 84 (26 Sep 2023 09:00) (78 - 88)  BP: 131/78 (26 Sep 2023 09:00) (128/64 - 137/87)  BP(mean): 100 (26 Sep 2023 09:00) (88 - 106)  RR: 16 (26 Sep 2023 09:00) (16 - 17)  SpO2: 93% (26 Sep 2023 09:00) (93% - 96%)    Parameters below as of 26 Sep 2023 09:00  Patient On (Oxygen Delivery Method): nasal cannula  O2 Flow (L/min): 2    I&O's Summary    25 Sep 2023 07:01  -  26 Sep 2023 07:00  --------------------------------------------------------  IN: 2895 mL / OUT: 2100 mL / NET: 795 mL    26 Sep 2023 07:01  -  26 Sep 2023 13:14  --------------------------------------------------------  IN: 745 mL / OUT: 325 mL / NET: 420 mL        PHYSICAL EXAM:  HEENT: AT/NC, no facial asymmetry   Lungs: poor inspiration, no crackles, no wheezes  Heart: RRR  Abdomen; soft, no distension, no tenderness   Extremities: warm, no edema, no tenderness, no calf tenderness, no focal deficit    LABS:                        13.6   13.94 )-----------( 126      ( 26 Sep 2023 05:04 )             38.9     09-26    138  |  102  |  16  ----------------------------<  127<H>  3.9   |  26  |  1.13    Ca    9.0      26 Sep 2023 05:04  Phos  2.0     09-26  Mg     1.9     09-26    TPro  6.1  /  Alb  3.2<L>  /  TBili  2.2<H>  /  DBili  x   /  AST  10  /  ALT  12  /  AlkPhos  39<L>  09-26    LIVER FUNCTIONS - ( 26 Sep 2023 05:04 )  Alb: 3.2 g/dL / Pro: 6.1 g/dL / ALK PHOS: 39 U/L / ALT: 12 U/L / AST: 10 U/L / GGT: x           PT/INR - ( 24 Sep 2023 23:45 )   PT: 13.0 sec;   INR: 1.16          PTT - ( 24 Sep 2023 23:45 )  PTT:28.3 sec  CAPILLARY BLOOD GLUCOSE        Urinalysis Basic - ( 26 Sep 2023 05:04 )    Color: x / Appearance: x / SG: x / pH: x  Gluc: 127 mg/dL / Ketone: x  / Bili: x / Urobili: x   Blood: x / Protein: x / Nitrite: x   Leuk Esterase: x / RBC: x / WBC x   Sq Epi: x / Non Sq Epi: x / Bacteria: x        MICRODATA:    Culture - Tissue with Gram Stain (collected 25 Sep 2023 02:54)  Source: .Tissue abdominal tissue for culture ## 2  Gram Stain (25 Sep 2023 05:27):    Moderate WBC's    Numerous Gram positive cocci in pairs    Few Gram Positive Rods  Preliminary Report (26 Sep 2023 12:35):    Numerous Escherichia coli    Numerous Pseudomonas aeruginosa    Rare Klebsiella pneumoniae    Few Streptococcus constellatus    Moderate Streptococcus anginosus    Culture in progress        RADIOLOGY/OTHER STUDIES:  
Doing okay today.  Had a loose bowel movement, and is passing flatus.  The only complaint he has is that the NGT is annoying him.    Remaining ROS negative   PHYSICAL EXAM:    General: fatigued appearing, but otherwise in no acute distress, sitting up in bed  HEENT: NC/AT; NGT in Left nare  Cardiovascular: +S1/S2, RRR, no mrg  Respiratory: CTA B/L; no W/R/R  Gastrointestinal: soft, NT/ND; +BSx4  Extremities: WWP; no edema  Neurological: no focal deficits noted, no facial asymmetry, speech is fluent  Psychiatric: pleasant mood and affect    VITAL SIGNS:  Vital Signs Last 24 Hrs  T(C): 36.8 (27 Sep 2023 14:36), Max: 38.2 (26 Sep 2023 17:00)  T(F): 98.3 (27 Sep 2023 14:36), Max: 100.7 (26 Sep 2023 17:00)  HR: 74 (27 Sep 2023 13:10) (70 - 78)  BP: 133/81 (27 Sep 2023 13:10) (132/82 - 143/87)  BP(mean): 102 (27 Sep 2023 13:10) (101 - 117)  RR: 17 (27 Sep 2023 13:10) (17 - 17)  SpO2: 94% (27 Sep 2023 13:10) (92% - 96%)    Parameters below as of 27 Sep 2023 13:10  Patient On (Oxygen Delivery Method): nasal cannula  O2 Flow (L/min): 2    MEDICATIONS:  MEDICATIONS  (STANDING):  heparin   Injectable 5000 Unit(s) SubCutaneous every 8 hours  lactated ringers. 1000 milliLiter(s) (130 mL/Hr) IV Continuous <Continuous>  pantoprazole  Injectable 40 milliGRAM(s) IV Push daily  piperacillin/tazobactam IVPB.. 3.375 Gram(s) IV Intermittent every 8 hours    MEDICATIONS  (PRN):  acetaminophen   IVPB .. 1000 milliGRAM(s) IV Intermittent once PRN Mild Pain (1 - 3), Moderate Pain (4 - 6), Severe Pain (7 - 10)  metoclopramide Injectable 10 milliGRAM(s) IV Push two times a day PRN nausea  ondansetron Injectable 4 milliGRAM(s) IV Push every 6 hours PRN Nausea and/or Vomiting      ALLERGIES:  Allergies    No Known Allergies    Intolerances        LABS:                        13.7   12.29 )-----------( 149      ( 27 Sep 2023 05:50 )             40.3     09-27    138  |  102  |  20  ----------------------------<  129<H>  3.8   |  28  |  1.01    Ca    8.4      27 Sep 2023 05:50  Phos  2.0     09-27  Mg     2.0     09-27    TPro  5.5<L>  /  Alb  2.8<L>  /  TBili  1.2  /  DBili  0.4<H>  /  AST  9<L>  /  ALT  11  /  AlkPhos  40  09-27      Urinalysis Basic - ( 27 Sep 2023 05:50 )    Color: x / Appearance: x / SG: x / pH: x  Gluc: 129 mg/dL / Ketone: x  / Bili: x / Urobili: x   Blood: x / Protein: x / Nitrite: x   Leuk Esterase: x / RBC: x / WBC x   Sq Epi: x / Non Sq Epi: x / Bacteria: x      CAPILLARY BLOOD GLUCOSE      POCT Blood Glucose.: 134 mg/dL (26 Sep 2023 16:45)      RADIOLOGY & ADDITIONAL TESTS: Reviewed.
INTERVAL HPI/OVERNIGHT EVENTS:   SURGERY ATTENDING    STATUS POST:      Laparoscopic Appendectomy , JANE, Evacuation of Pelvic and all  4 quadrants abscesses, Abdominal washout, Drainage      POST OPERATIVE DAY #: 4    SUBJECTIVE:  Flatus: [x ] YES [ ] NO             Bowel Movement: [x ] YES [ ] NO  Pain (0-10):         2   Pain Control Adequate: [x ] YES [ ] NO  Nausea: [ ] YES [x ] NO            Vomiting: [ ] YES [x ] NO  Diarrhea: [ ] YES [x ] NO         Constipation: [ ] YES [x ] NO     Chest Pain: [ ] YES [x ] NO    SOB:  [ ] YES [x ] NO    MEDICATIONS  (STANDING):  heparin   Injectable 5000 Unit(s) SubCutaneous every 8 hours  influenza   Vaccine 0.5 milliLiter(s) IntraMuscular once  pantoprazole  Injectable 40 milliGRAM(s) IV Push daily  piperacillin/tazobactam IVPB.. 3.375 Gram(s) IV Intermittent every 8 hours    MEDICATIONS  (PRN):  acetaminophen   IVPB .. 1000 milliGRAM(s) IV Intermittent once PRN Mild Pain (1 - 3), Moderate Pain (4 - 6), Severe Pain (7 - 10)  metoclopramide Injectable 10 milliGRAM(s) IV Push two times a day PRN nausea  ondansetron Injectable 4 milliGRAM(s) IV Push every 6 hours PRN Nausea and/or Vomiting      Vital Signs Last 24 Hrs  T(C): 36.3 (29 Sep 2023 13:58), Max: 36.9 (28 Sep 2023 21:45)  T(F): 97.4 (29 Sep 2023 13:58), Max: 98.5 (28 Sep 2023 21:45)  HR: 74 (29 Sep 2023 12:15) (72 - 82)  BP: 138/88 (29 Sep 2023 12:15) (133/79 - 146/80)  BP(mean): 105 (29 Sep 2023 12:15) (98 - 108)  RR: 18 (29 Sep 2023 12:15) (17 - 18)  SpO2: 94% (29 Sep 2023 12:15) (92% - 96%)    Parameters below as of 29 Sep 2023 12:15  Patient On (Oxygen Delivery Method): room air        PHYSICAL EXAM:      Constitutional:    Eyes:    ENMT:    Neck:    Breasts:    Back:    Respiratory:    Cardiovascular:    Gastrointestinal:    Genitourinary:    Rectal:    Extremities:    Vascular:    Neurological:    Skin:    Lymph Nodes:    Musculoskeletal:    Psychiatric:        I&O's Detail    28 Sep 2023 07:01  -  29 Sep 2023 07:00  --------------------------------------------------------  IN:    dextrose 5% + sodium chloride 0.45%: 2080 mL    IV PiggyBack: 525 mL    Oral Fluid: 480 mL  Total IN: 3085 mL    OUT:    Bulb (mL): 43 mL    Nasogastric/Oral tube (mL): 0 mL    Voided (mL): 1600 mL  Total OUT: 1643 mL    Total NET: 1442 mL      29 Sep 2023 07:01  -  29 Sep 2023 15:58  --------------------------------------------------------  IN:    IV PiggyBack: 125 mL    Oral Fluid: 500 mL  Total IN: 625 mL    OUT:    Bulb (mL): 15 mL    Voided (mL): 500 mL  Total OUT: 515 mL    Total NET: 110 mL          LABS:                        13.6   8.51  )-----------( 198      ( 29 Sep 2023 06:49 )             40.5     09-29    140  |  105  |  16  ----------------------------<  105<H>  3.3<L>   |  26  |  1.04    Ca    8.2<L>      29 Sep 2023 06:49  Phos  2.5     09-29  Mg     1.8     09-29        Urinalysis Basic - ( 29 Sep 2023 06:49 )    Color: x / Appearance: x / SG: x / pH: x  Gluc: 105 mg/dL / Ketone: x  / Bili: x / Urobili: x   Blood: x / Protein: x / Nitrite: x   Leuk Esterase: x / RBC: x / WBC x   Sq Epi: x / Non Sq Epi: x / Bacteria: x        RADIOLOGY & ADDITIONAL STUDIES:
INTERVAL HPI/OVERNIGHT EVENTS:   SURGERY ATTENDING    STATUS POST:  Laparoscopic Appendectomy , JANE, Evacuation of Pelvic and all  4 quadrants abscesses, Abdominal washout, Drainage    POST OPERATIVE DAY #: 0    SUBJECTIVE:  Flatus: [ ] YES [x ] NO             Bowel Movement: [ ] YES [x ] NO  Pain (0-10):      3      Pain Control Adequate: [x ] YES [ ] NO  Nausea: [ ] YES [x ] NO            Vomiting: [ ] YES [x ] NO  Diarrhea: [ ] YES [x ] NO         Constipation: [ ] YES [x ] NO     Chest Pain: [ ] YES [x ] NO    SOB:  [ ] YES [x ] NO    MEDICATIONS  (STANDING):  acetaminophen   IVPB .. 1000 milliGRAM(s) IV Intermittent once  heparin   Injectable 5000 Unit(s) SubCutaneous every 8 hours  lactated ringers. 1000 milliLiter(s) (140 mL/Hr) IV Continuous <Continuous>  piperacillin/tazobactam IVPB.. 3.375 Gram(s) IV Intermittent every 8 hours    MEDICATIONS  (PRN):  HYDROmorphone  Injectable 0.5 milliGRAM(s) IV Push every 30 minutes PRN Severe Pain (7 - 10)  HYDROmorphone  Injectable 0.5 milliGRAM(s) IV Push every 4 hours PRN Severe Pain (7 - 10)  HYDROmorphone  Injectable 0.25 milliGRAM(s) IV Push every 4 hours PRN Moderate Pain (4 - 6)  ondansetron Injectable 4 milliGRAM(s) IV Push every 6 hours PRN Nausea and/or Vomiting      Vital Signs Last 24 Hrs  T(C): 36.7 (25 Sep 2023 14:23), Max: 37.3 (24 Sep 2023 20:46)  T(F): 98.1 (25 Sep 2023 14:23), Max: 99.1 (24 Sep 2023 20:46)  HR: 88 (25 Sep 2023 08:25) (73 - 100)  BP: 119/66 (25 Sep 2023 08:25) (113/61 - 152/76)  BP(mean): 85 (25 Sep 2023 08:25) (79 - 95)  RR: 18 (25 Sep 2023 05:01) (15 - 20)  SpO2: 93% (25 Sep 2023 08:25) (93% - 99%)    Parameters below as of 25 Sep 2023 08:25  Patient On (Oxygen Delivery Method): room air        PHYSICAL EXAM:      Constitutional:    Eyes:    ENMT:    Neck:    Breasts:    Back:    Respiratory:    Cardiovascular:    Gastrointestinal:    Genitourinary:    Rectal:    Extremities:    Vascular:    Neurological:    Skin:    Lymph Nodes:    Musculoskeletal:    Psychiatric:        I&O's Detail    24 Sep 2023 07:01  -  25 Sep 2023 07:00  --------------------------------------------------------  IN:    Lactated Ringers: 625 mL  Total IN: 625 mL    OUT:    Bulb (mL): 200 mL    Indwelling Catheter - Urethral (mL): 555 mL  Total OUT: 755 mL    Total NET: -130 mL      25 Sep 2023 07:01  -  25 Sep 2023 15:50  --------------------------------------------------------  IN:    Lactated Ringers: 125 mL  Total IN: 125 mL    OUT:  Total OUT: 0 mL    Total NET: 125 mL          LABS:                        13.7   17.80 )-----------( 134      ( 25 Sep 2023 05:30 )             39.2     09-25    138  |  106  |  13  ----------------------------<  165<H>  4.3   |  24  |  1.14    Ca    8.7      25 Sep 2023 05:30  Phos  2.6     09-25  Mg     1.5     09-25    TPro  5.8<L>  /  Alb  3.3  /  TBili  2.3<H>  /  DBili  x   /  AST  9<L>  /  ALT  13  /  AlkPhos  42  09-25    PT/INR - ( 24 Sep 2023 23:45 )   PT: 13.0 sec;   INR: 1.16          PTT - ( 24 Sep 2023 23:45 )  PTT:28.3 sec  Urinalysis Basic - ( 25 Sep 2023 05:30 )    Color: x / Appearance: x / SG: x / pH: x  Gluc: 165 mg/dL / Ketone: x  / Bili: x / Urobili: x   Blood: x / Protein: x / Nitrite: x   Leuk Esterase: x / RBC: x / WBC x   Sq Epi: x / Non Sq Epi: x / Bacteria: x        RADIOLOGY & ADDITIONAL STUDIES:
INTERVAL HPI/OVERNIGHT EVENTS: Landon out, TOV @ 8am. Voided 175 CCs. 1x temp 100.7, IV tylenol given.    STATUS POST:  Laparoscopic Appendectomy    POST OPERATIVE DAY #: 2    SUBJECTIVE:  pt seen at bedside, denies nausea/vomiting. +flatus    MEDICATIONS  (STANDING):  heparin   Injectable 5000 Unit(s) SubCutaneous every 8 hours  lactated ringers. 1000 milliLiter(s) (130 mL/Hr) IV Continuous <Continuous>  pantoprazole  Injectable 40 milliGRAM(s) IV Push daily  piperacillin/tazobactam IVPB.. 3.375 Gram(s) IV Intermittent every 8 hours    MEDICATIONS  (PRN):  HYDROmorphone  Injectable 0.25 milliGRAM(s) IV Push every 4 hours PRN Moderate Pain (4 - 6)  HYDROmorphone  Injectable 0.5 milliGRAM(s) IV Push every 4 hours PRN Severe Pain (7 - 10)  metoclopramide Injectable 10 milliGRAM(s) IV Push two times a day PRN nausea  ondansetron Injectable 4 milliGRAM(s) IV Push every 6 hours PRN Nausea and/or Vomiting      Vital Signs Last 24 Hrs  T(C): 36.9 (27 Sep 2023 05:00), Max: 38.2 (26 Sep 2023 17:00)  T(F): 98.4 (27 Sep 2023 05:00), Max: 100.7 (26 Sep 2023 17:00)  HR: 70 (27 Sep 2023 03:45) (70 - 84)  BP: 132/82 (27 Sep 2023 03:45) (131/78 - 148/97)  BP(mean): 102 (27 Sep 2023 03:45) (100 - 117)  RR: 17 (27 Sep 2023 03:45) (16 - 17)  SpO2: 95% (27 Sep 2023 03:45) (92% - 96%)    Parameters below as of 27 Sep 2023 03:45  Patient On (Oxygen Delivery Method): nasal cannula  O2 Flow (L/min): 2      PHYSICAL EXAM:      Constitutional: A&Ox3    Respiratory: non labored breathing, no respiratory distress    Cardiovascular: NSR, RRR    Gastrointestinal: soft, nondistended, appropriate incisional tenderness, incisions c/d/i    Extremities: (-) edema                  I&O's Detail    26 Sep 2023 07:01  -  27 Sep 2023 07:00  --------------------------------------------------------  IN:    IV PiggyBack: 100 mL    IV PiggyBack: 675 mL    Lactated Ringers: 3120 mL  Total IN: 3895 mL    OUT:    Bulb (mL): 245 mL    Indwelling Catheter - Urethral (mL): 1600 mL    Nasogastric/Oral tube (mL): 1150 mL    Voided (mL): 375 mL  Total OUT: 3370 mL    Total NET: 525 mL          LABS:                        13.7   12.29 )-----------( 149      ( 27 Sep 2023 05:50 )             40.3     09-27    138  |  102  |  20  ----------------------------<  129<H>  3.8   |  28  |  1.01    Ca    8.4      27 Sep 2023 05:50  Phos  2.0     09-27  Mg     2.0     09-27    TPro  5.5<L>  /  Alb  2.8<L>  /  TBili  1.2  /  DBili  0.4<H>  /  AST  9<L>  /  ALT  11  /  AlkPhos  40  09-27      Urinalysis Basic - ( 27 Sep 2023 05:50 )    Color: x / Appearance: x / SG: x / pH: x  Gluc: 129 mg/dL / Ketone: x  / Bili: x / Urobili: x   Blood: x / Protein: x / Nitrite: x   Leuk Esterase: x / RBC: x / WBC x   Sq Epi: x / Non Sq Epi: x / Bacteria: x        RADIOLOGY & ADDITIONAL STUDIES:
Pt seen and examined   doing good  having flatus and BM  tolerating diet    REVIEW OF SYSTEMS:  Constitutional: No fever  Cardiovascular: No chest pain, palpitations, dizzines  Gastrointestinal: No abdominal or epigastric pain. No nausea, vomiting   Skin: No itching, burning, rashes or lesions       MEDICATIONS:  MEDICATIONS  (STANDING):  heparin   Injectable 5000 Unit(s) SubCutaneous every 8 hours  pantoprazole  Injectable 40 milliGRAM(s) IV Push daily  piperacillin/tazobactam IVPB.. 3.375 Gram(s) IV Intermittent every 8 hours    MEDICATIONS  (PRN):  acetaminophen   IVPB .. 1000 milliGRAM(s) IV Intermittent once PRN Mild Pain (1 - 3), Moderate Pain (4 - 6), Severe Pain (7 - 10)  metoclopramide Injectable 10 milliGRAM(s) IV Push two times a day PRN nausea  ondansetron Injectable 4 milliGRAM(s) IV Push every 6 hours PRN Nausea and/or Vomiting      Allergies    No Known Allergies    Intolerances        Vital Signs Last 24 Hrs  T(C): 36.7 (29 Sep 2023 09:22), Max: 36.9 (28 Sep 2023 21:45)  T(F): 98.1 (29 Sep 2023 09:22), Max: 98.5 (28 Sep 2023 21:45)  HR: 76 (29 Sep 2023 08:06) (72 - 82)  BP: 145/72 (29 Sep 2023 08:06) (133/79 - 146/80)  BP(mean): 100 (29 Sep 2023 08:06) (98 - 108)  RR: 17 (29 Sep 2023 08:06) (17 - 18)  SpO2: 96% (29 Sep 2023 08:06) (92% - 96%)    Parameters below as of 29 Sep 2023 08:06  Patient On (Oxygen Delivery Method): room air        09-28 @ 07:01  -  09-29 @ 07:00  --------------------------------------------------------  IN: 3085 mL / OUT: 1643 mL / NET: 1442 mL    09-29 @ 07:01  -  09-29 @ 12:29  --------------------------------------------------------  IN: 275 mL / OUT: 315 mL / NET: -40 mL        PHYSICAL EXAM:    General: Well developed; well nourished; in no acute distress  HEENT: MMM, conjunctiva and sclera clear  Gastrointestinal: Soft non-tender non-distended; Normal bowel sounds; GETACHEW clear  Skin: Warm and dry. No obvious rash    LABS:      CBC Full  -  ( 29 Sep 2023 06:49 )  WBC Count : 8.51 K/uL  RBC Count : 4.31 M/uL  Hemoglobin : 13.6 g/dL  Hematocrit : 40.5 %  Platelet Count - Automated : 198 K/uL  Mean Cell Volume : 94.0 fl  Mean Cell Hemoglobin : 31.6 pg  Mean Cell Hemoglobin Concentration : 33.6 gm/dL  Auto Neutrophil # : x  Auto Lymphocyte # : x  Auto Monocyte # : x  Auto Eosinophil # : x  Auto Basophil # : x  Auto Neutrophil % : x  Auto Lymphocyte % : x  Auto Monocyte % : x  Auto Eosinophil % : x  Auto Basophil % : x    09-29    140  |  105  |  16  ----------------------------<  105<H>  3.3<L>   |  26  |  1.04    Ca    8.2<L>      29 Sep 2023 06:49  Phos  2.5     09-29  Mg     1.8     09-29            Urinalysis Basic - ( 29 Sep 2023 06:49 )    Color: x / Appearance: x / SG: x / pH: x  Gluc: 105 mg/dL / Ketone: x  / Bili: x / Urobili: x   Blood: x / Protein: x / Nitrite: x   Leuk Esterase: x / RBC: x / WBC x   Sq Epi: x / Non Sq Epi: x / Bacteria: x                RADIOLOGY & ADDITIONAL STUDIES (The following images were personally reviewed):
Pt seen and examined   events noted  NGT in  feels much better  says he passed flatus several times this am    REVIEW OF SYSTEMS:  Constitutional: No fever,  Cardiovascular: No chest pain, palpitations, dizziness   Gastrointestinal: No abdominal or epigastric pain. No nausea, vomitingNo BMs + flatus  Skin: No itching, burning, rashes or lesions       MEDICATIONS:  MEDICATIONS  (STANDING):  heparin   Injectable 5000 Unit(s) SubCutaneous every 8 hours  lactated ringers. 1000 milliLiter(s) (130 mL/Hr) IV Continuous <Continuous>  pantoprazole  Injectable 40 milliGRAM(s) IV Push daily  piperacillin/tazobactam IVPB.. 3.375 Gram(s) IV Intermittent every 8 hours  potassium phosphate IVPB 30 milliMole(s) IV Intermittent once    MEDICATIONS  (PRN):  metoclopramide Injectable 10 milliGRAM(s) IV Push two times a day PRN nausea  ondansetron Injectable 4 milliGRAM(s) IV Push every 6 hours PRN Nausea and/or Vomiting      Allergies    No Known Allergies    Intolerances        Vital Signs Last 24 Hrs  T(C): 36.8 (27 Sep 2023 09:24), Max: 38.2 (26 Sep 2023 17:00)  T(F): 98.2 (27 Sep 2023 09:24), Max: 100.7 (26 Sep 2023 17:00)  HR: 74 (27 Sep 2023 08:46) (70 - 78)  BP: 139/83 (27 Sep 2023 08:46) (132/82 - 148/97)  BP(mean): 105 (27 Sep 2023 08:46) (101 - 117)  RR: 17 (27 Sep 2023 08:46) (16 - 17)  SpO2: 94% (27 Sep 2023 08:46) (92% - 96%)    Parameters below as of 27 Sep 2023 08:46  Patient On (Oxygen Delivery Method): nasal cannula  O2 Flow (L/min): 2 09-26 @ 07:01  -  09-27 @ 07:00  --------------------------------------------------------  IN: 3895 mL / OUT: 3370 mL / NET: 525 mL        PHYSICAL EXAM:    General: ; in no acute distress  HEENT: MMM, conjunctiva and sclera clear  Gastrointestinal: distended; +/-bowel sounds; GETACHEW  Skin: Warm and dry. No obvious rash    LABS:      CBC Full  -  ( 27 Sep 2023 05:50 )  WBC Count : 12.29 K/uL  RBC Count : 4.31 M/uL  Hemoglobin : 13.7 g/dL  Hematocrit : 40.3 %  Platelet Count - Automated : 149 K/uL  Mean Cell Volume : 93.5 fl  Mean Cell Hemoglobin : 31.8 pg  Mean Cell Hemoglobin Concentration : 34.0 gm/dL  Auto Neutrophil # : x  Auto Lymphocyte # : x  Auto Monocyte # : x  Auto Eosinophil # : x  Auto Basophil # : x  Auto Neutrophil % : x  Auto Lymphocyte % : x  Auto Monocyte % : x  Auto Eosinophil % : x  Auto Basophil % : x    09-27    138  |  102  |  20  ----------------------------<  129<H>  3.8   |  28  |  1.01    Ca    8.4      27 Sep 2023 05:50  Phos  2.0     09-27  Mg     2.0     09-27    TPro  5.5<L>  /  Alb  2.8<L>  /  TBili  1.2  /  DBili  0.4<H>  /  AST  9<L>  /  ALT  11  /  AlkPhos  40  09-27          Urinalysis Basic - ( 27 Sep 2023 05:50 )    Color: x / Appearance: x / SG: x / pH: x  Gluc: 129 mg/dL / Ketone: x  / Bili: x / Urobili: x   Blood: x / Protein: x / Nitrite: x   Leuk Esterase: x / RBC: x / WBC x   Sq Epi: x / Non Sq Epi: x / Bacteria: x                RADIOLOGY & ADDITIONAL STUDIES (The following images were personally reviewed):
STATUS POST:  Laparoscopic appendectomy    Laparoscopic washout of abdominal cavity        POST OPERATIVE DAY #: 4    SUBJECTIVE: Pt seen and examined by chief resident. Pt is doing well, resting comfortably on bed. Pain controlled. Clear liquid diet tolerated. Ambulating out of bed. +F/+BM. No nausea or vomiting. No complaints at this time.    Vital Signs Last 24 Hrs  T(C): 36.9 (28 Sep 2023 21:45), Max: 36.9 (28 Sep 2023 10:19)  T(F): 98.5 (28 Sep 2023 21:45), Max: 98.5 (28 Sep 2023 10:19)  HR: 72 (29 Sep 2023 03:50) (72 - 82)  BP: 140/86 (29 Sep 2023 03:50) (129/85 - 146/80)  BP(mean): 108 (29 Sep 2023 03:50) (98 - 108)  RR: 18 (29 Sep 2023 03:50) (17 - 18)  SpO2: 92% (29 Sep 2023 03:50) (92% - 96%)    Parameters below as of 29 Sep 2023 03:50  Patient On (Oxygen Delivery Method): room air        I&O's Summary    28 Sep 2023 07:01  -  29 Sep 2023 07:00  --------------------------------------------------------  IN: 3060 mL / OUT: 1643 mL / NET: 1417 mL        Physical Exam:  General Appearance: Appears well, NAD  Pulmonary: Nonlabored breathing, no respiratory distress, satting 96% on RA  Cardiovascular: NSR  Abdomen: Soft, nondisteded, nontender, incisions clean and dry and intact, GETACHEW serous  Extremities: WWP, SCD's in place     LABS:                        13.6   8.51  )-----------( 198      ( 29 Sep 2023 06:49 )             40.5     09-28    140  |  106  |  22  ----------------------------<  100<H>  3.9   |  25  |  0.96    Ca    8.6      28 Sep 2023 07:21  Phos  2.4     09-28  Mg     2.0     09-28        Urinalysis Basic - ( 28 Sep 2023 07:21 )    Color: x / Appearance: x / SG: x / pH: x  Gluc: 100 mg/dL / Ketone: x  / Bili: x / Urobili: x   Blood: x / Protein: x / Nitrite: x   Leuk Esterase: x / RBC: x / WBC x   Sq Epi: x / Non Sq Epi: x / Bacteria: x      
General Surgery Post op Check  Procedure: Lap appy  Pt seen and examined without complaints. Pain is controlled. 19 fr neto draining ~30 CCs serous fluid. Denies SOB/CP/N/V.     Vital Signs Last 24 Hrs  T(C): 36.9 (25 Sep 2023 03:49), Max: 37.3 (24 Sep 2023 20:46)  T(F): 98.4 (25 Sep 2023 03:49), Max: 99.1 (24 Sep 2023 20:46)  HR: 84 (25 Sep 2023 04:40) (73 - 100)  BP: 114/64 (25 Sep 2023 04:40) (113/61 - 152/76)  BP(mean): 82 (25 Sep 2023 04:40) (79 - 95)  RR: 16 (25 Sep 2023 04:40) (15 - 20)  SpO2: 97% (25 Sep 2023 04:40) (95% - 99%)    Parameters below as of 25 Sep 2023 04:40  Patient On (Oxygen Delivery Method): nasal cannula  O2 Flow (L/min): 2      I&O's Summary    24 Sep 2023 07:01  -  25 Sep 2023 05:18  --------------------------------------------------------  IN: 500 mL / OUT: 755 mL / NET: -255 mL        Physical Exam  Gen: NAD, A&Ox3  Pulm: No respiratory distress, no subcostal retractions  CV: RRR, no JVD  Abd: Soft, Distended, mod tender  Drains: GETACHEW x 1 in RLQ/RMQ draining 30 CCs serous fluid  Extremities:  FROM, warm and well perfused, equal bilateral muscle strength      Patient is a 53 yr old male pmh of GERD who presented to [facility] ED after 1x day hx(Since 9/23) of severe RLQ abdominal pain, nausea, vomiting, chills and fever and labs significant for a WBC of 23.75 and CTAP of dilated 2.1cm appendix w/phlegmonous character consistent with a diagnosis of acute appendicitis.    NPO/IVF  Pain/Nausea PRN  Darline Landon  SCD/SQH  19 fr neto  AM labs  Tele    
INTERVAL HPI/OVERNIGHT EVENTS:   SURGERY ATTENDING    STATUS POST:      Laparoscopic Appendectomy , JANE, Evacuation of Pelvic and all  4 quadrants abscesses, Abdominal washout, Drainage        POST OPERATIVE DAY #: 1    SUBJECTIVE:  Flatus: [ ] YES [x ] NO             Bowel Movement: [ ] YES [x ] NO  Pain (0-10):       3     Pain Control Adequate: [x ] YES [ ] NO  Nausea: [ ] YES [x ] NO            Vomiting: [ ] YES [x ] NO  Diarrhea: [ ] YES [x ] NO         Constipation: [ ] YES [x ] NO     Chest Pain: [ ] YES [x ] NO    SOB:  [ ] YES [x ] NO    MEDICATIONS  (STANDING):  famotidine    Tablet 20 milliGRAM(s) Oral daily  heparin   Injectable 5000 Unit(s) SubCutaneous every 8 hours  lactated ringers. 1000 milliLiter(s) (130 mL/Hr) IV Continuous <Continuous>  piperacillin/tazobactam IVPB.. 3.375 Gram(s) IV Intermittent every 8 hours    MEDICATIONS  (PRN):  HYDROmorphone  Injectable 0.25 milliGRAM(s) IV Push every 4 hours PRN Moderate Pain (4 - 6)  HYDROmorphone  Injectable 0.5 milliGRAM(s) IV Push every 4 hours PRN Severe Pain (7 - 10)  ondansetron Injectable 4 milliGRAM(s) IV Push every 6 hours PRN Nausea and/or Vomiting      Vital Signs Last 24 Hrs  T(C): 36.3 (26 Sep 2023 14:24), Max: 37.1 (25 Sep 2023 18:00)  T(F): 97.4 (26 Sep 2023 14:24), Max: 98.8 (25 Sep 2023 18:00)  HR: 84 (26 Sep 2023 09:00) (78 - 88)  BP: 131/78 (26 Sep 2023 09:00) (128/64 - 137/87)  BP(mean): 100 (26 Sep 2023 09:00) (88 - 106)  RR: 16 (26 Sep 2023 09:00) (16 - 17)  SpO2: 93% (26 Sep 2023 09:00) (93% - 96%)    Parameters below as of 26 Sep 2023 09:00  Patient On (Oxygen Delivery Method): nasal cannula  O2 Flow (L/min): 2      PHYSICAL EXAM:      Constitutional:    Eyes:    ENMT:    Neck:    Breasts:    Back:    Respiratory:    Cardiovascular:    Gastrointestinal:    Genitourinary:    Rectal:    Extremities:    Vascular:    Neurological:    Skin:    Lymph Nodes:    Musculoskeletal:    Psychiatric:        I&O's Detail    25 Sep 2023 07:01  -  26 Sep 2023 07:00  --------------------------------------------------------  IN:    Lactated Ringers: 1855 mL    Lactated Ringers: 1040 mL  Total IN: 2895 mL    OUT:    Bulb (mL): 100 mL    Indwelling Catheter - Urethral (mL): 2000 mL  Total OUT: 2100 mL    Total NET: 795 mL      26 Sep 2023 07:01  -  26 Sep 2023 14:55  --------------------------------------------------------  IN:    IV PiggyBack: 225 mL    Lactated Ringers: 520 mL  Total IN: 745 mL    OUT:    Bulb (mL): 0 mL    Indwelling Catheter - Urethral (mL): 325 mL  Total OUT: 325 mL    Total NET: 420 mL          LABS:                        13.6   13.94 )-----------( 126      ( 26 Sep 2023 05:04 )             38.9     09-26    138  |  102  |  16  ----------------------------<  127<H>  3.9   |  26  |  1.13    Ca    9.0      26 Sep 2023 05:04  Phos  2.0     09-26  Mg     1.9     09-26    TPro  6.1  /  Alb  3.2<L>  /  TBili  2.2<H>  /  DBili  x   /  AST  10  /  ALT  12  /  AlkPhos  39<L>  09-26    PT/INR - ( 24 Sep 2023 23:45 )   PT: 13.0 sec;   INR: 1.16          PTT - ( 24 Sep 2023 23:45 )  PTT:28.3 sec  Urinalysis Basic - ( 26 Sep 2023 05:04 )    Color: x / Appearance: x / SG: x / pH: x  Gluc: 127 mg/dL / Ketone: x  / Bili: x / Urobili: x   Blood: x / Protein: x / Nitrite: x   Leuk Esterase: x / RBC: x / WBC x   Sq Epi: x / Non Sq Epi: x / Bacteria: x        RADIOLOGY & ADDITIONAL STUDIES:
INTERVAL HPI/OVERNIGHT EVENTS:   SURGERY ATTENDING    STATUS POST:      Laparoscopic Appendectomy , JANE, Evacuation of Pelvic and all  4 quadrants abscesses, Abdominal washout, Drainage      POST OPERATIVE DAY #: 3    SUBJECTIVE:  Flatus: [x ] YES [ ] NO             Bowel Movement: [x ] YES [ ] NO  Pain (0-10):      2      Pain Control Adequate: [x ] YES [ ] NO  Nausea: [ ] YES [x ] NO            Vomiting: [ ] YES [x ] NO  Diarrhea: [ ] YES [x ] NO         Constipation: [ ] YES [x ] NO     Chest Pain: [ ] YES [x ] NO    SOB:  [ ] YES [x ] NO    MEDICATIONS  (STANDING):  dextrose 5% + sodium chloride 0.45%. 1000 milliLiter(s) (130 mL/Hr) IV Continuous <Continuous>  heparin   Injectable 5000 Unit(s) SubCutaneous every 8 hours  pantoprazole  Injectable 40 milliGRAM(s) IV Push daily  piperacillin/tazobactam IVPB.. 3.375 Gram(s) IV Intermittent every 8 hours    MEDICATIONS  (PRN):  acetaminophen   IVPB .. 1000 milliGRAM(s) IV Intermittent once PRN Mild Pain (1 - 3), Moderate Pain (4 - 6), Severe Pain (7 - 10)  metoclopramide Injectable 10 milliGRAM(s) IV Push two times a day PRN nausea  ondansetron Injectable 4 milliGRAM(s) IV Push every 6 hours PRN Nausea and/or Vomiting      Vital Signs Last 24 Hrs  T(C): 36.8 (28 Sep 2023 17:00), Max: 36.9 (28 Sep 2023 10:19)  T(F): 98.2 (28 Sep 2023 17:00), Max: 98.5 (28 Sep 2023 10:19)  HR: 82 (28 Sep 2023 16:22) (62 - 82)  BP: 133/79 (28 Sep 2023 16:22) (129/76 - 139/89)  BP(mean): 98 (28 Sep 2023 16:22) (96 - 108)  RR: 18 (28 Sep 2023 16:22) (17 - 18)  SpO2: 96% (28 Sep 2023 16:22) (93% - 96%)    Parameters below as of 28 Sep 2023 16:22  Patient On (Oxygen Delivery Method): room air        PHYSICAL EXAM:      Constitutional:    Eyes:    ENMT:    Neck:    Breasts:    Back:    Respiratory:    Cardiovascular:    Gastrointestinal:    Genitourinary:    Rectal:    Extremities:    Vascular:    Neurological:    Skin:    Lymph Nodes:    Musculoskeletal:    Psychiatric:        I&O's Detail    27 Sep 2023 07:01  -  28 Sep 2023 07:00  --------------------------------------------------------  IN:    dextrose 5% + sodium chloride 0.45%: 1560 mL    IV PiggyBack: 200 mL    Lactated Ringers: 1170 mL  Total IN: 2930 mL    OUT:    Bulb (mL): 175 mL    Nasogastric/Oral tube (mL): 400 mL    Voided (mL): 800 mL  Total OUT: 1375 mL    Total NET: 1555 mL      28 Sep 2023 07:01  -  28 Sep 2023 18:27  --------------------------------------------------------  IN:    dextrose 5% + sodium chloride 0.45%: 1300 mL    IV PiggyBack: 375 mL  Total IN: 1675 mL    OUT:    Bulb (mL): 25 mL    Nasogastric/Oral tube (mL): 0 mL    Oral Fluid: 0 mL    Voided (mL): 600 mL  Total OUT: 625 mL    Total NET: 1050 mL          LABS:                        13.7   8.47  )-----------( 172      ( 28 Sep 2023 07:21 )             41.5     09-28    140  |  106  |  22  ----------------------------<  100<H>  3.9   |  25  |  0.96    Ca    8.6      28 Sep 2023 07:21  Phos  2.4     09-28  Mg     2.0     09-28    TPro  5.5<L>  /  Alb  2.8<L>  /  TBili  1.2  /  DBili  0.4<H>  /  AST  9<L>  /  ALT  11  /  AlkPhos  40  09-27      Urinalysis Basic - ( 28 Sep 2023 07:21 )    Color: x / Appearance: x / SG: x / pH: x  Gluc: 100 mg/dL / Ketone: x  / Bili: x / Urobili: x   Blood: x / Protein: x / Nitrite: x   Leuk Esterase: x / RBC: x / WBC x   Sq Epi: x / Non Sq Epi: x / Bacteria: x        RADIOLOGY & ADDITIONAL STUDIES:
STATUS POST:  Laparoscopic appendectomy    Laparoscopic washout of abdominal cavity      POST OPERATIVE DAY #: 3    SUBJECTIVE: Pt seen and examined by chief resident. Pt is doing well, resting comfortably on bed. Pain controlled. Ambulating out of bed. Passed gas multiple times overnight. Had 1 BM yesterday. No nausea or vomiting. No complaints at this time.    Vital Signs Last 24 Hrs  T(C): 36.7 (28 Sep 2023 04:10), Max: 36.8 (27 Sep 2023 09:24)  T(F): 98 (28 Sep 2023 04:10), Max: 98.3 (27 Sep 2023 14:36)  HR: 66 (28 Sep 2023 04:10) (62 - 84)  BP: 129/78 (28 Sep 2023 04:10) (122/89 - 139/83)  BP(mean): 98 (28 Sep 2023 04:10) (96 - 105)  RR: 17 (28 Sep 2023 04:10) (17 - 17)  SpO2: 96% (28 Sep 2023 04:10) (93% - 96%)    Parameters below as of 28 Sep 2023 04:10  Patient On (Oxygen Delivery Method): nasal cannula  O2 Flow (L/min): 2      I&O's Summary    27 Sep 2023 07:01  -  28 Sep 2023 07:00  --------------------------------------------------------  IN: 2930 mL / OUT: 1375 mL / NET: 1555 mL        Physical Exam:  General Appearance: Appears well, NAD  Pulmonary: Nonlabored breathing, no respiratory distress  HEENT: NGT with minimal light bilious output  Cardiovascular: NSR  Abdomen: Soft, nondisteded, nontender, GETACHEW serous, incisions clean dry and intact  Extremities: WWP, SCD's in place     LABS:                        13.7   12.29 )-----------( 149      ( 27 Sep 2023 05:50 )             40.3     09-27    138  |  102  |  20  ----------------------------<  129<H>  3.8   |  28  |  1.01    Ca    8.4      27 Sep 2023 05:50  Phos  2.0     09-27  Mg     2.0     09-27    TPro  5.5<L>  /  Alb  2.8<L>  /  TBili  1.2  /  DBili  0.4<H>  /  AST  9<L>  /  ALT  11  /  AlkPhos  40  09-27      Urinalysis Basic - ( 27 Sep 2023 05:50 )    Color: x / Appearance: x / SG: x / pH: x  Gluc: 129 mg/dL / Ketone: x  / Bili: x / Urobili: x   Blood: x / Protein: x / Nitrite: x   Leuk Esterase: x / RBC: x / WBC x   Sq Epi: x / Non Sq Epi: x / Bacteria: x      
  SUBJECTIVE:  Patient was seen and examined at bedside. Reports feeling improved, having BMs, no N/V. NGT removed. Denies SOB, no chest pain, no other complaints. Telemetry reviewed     Review of systems: No fever, chills, dizziness, HA, Changes in vision, CP, dyspnea, nausea or vomiting, dysuria, changes in bowel movements, LE edema. Rest of 12 point Review of systems negative unless otherwise documented elsewhere in note.     Diet, NPO:   With Chewing Gum (09-26-23 @ 16:12) [Active]      MEDICATIONS:  MEDICATIONS  (STANDING):  dextrose 5% + sodium chloride 0.45%. 1000 milliLiter(s) (130 mL/Hr) IV Continuous <Continuous>  heparin   Injectable 5000 Unit(s) SubCutaneous every 8 hours  pantoprazole  Injectable 40 milliGRAM(s) IV Push daily  piperacillin/tazobactam IVPB.. 3.375 Gram(s) IV Intermittent every 8 hours    MEDICATIONS  (PRN):  acetaminophen   IVPB .. 1000 milliGRAM(s) IV Intermittent once PRN Mild Pain (1 - 3), Moderate Pain (4 - 6), Severe Pain (7 - 10)  metoclopramide Injectable 10 milliGRAM(s) IV Push two times a day PRN nausea  ondansetron Injectable 4 milliGRAM(s) IV Push every 6 hours PRN Nausea and/or Vomiting      Allergies    No Known Allergies    Intolerances        OBJECTIVE:  Vital Signs Last 24 Hrs  T(C): 36.9 (28 Sep 2023 10:19), Max: 36.9 (28 Sep 2023 10:19)  T(F): 98.5 (28 Sep 2023 10:19), Max: 98.5 (28 Sep 2023 10:19)  HR: 76 (28 Sep 2023 08:34) (62 - 84)  BP: 139/89 (28 Sep 2023 08:34) (122/89 - 139/89)  BP(mean): 108 (28 Sep 2023 08:34) (96 - 108)  RR: 18 (28 Sep 2023 08:34) (17 - 18)  SpO2: 96% (28 Sep 2023 08:34) (93% - 96%)    Parameters below as of 28 Sep 2023 08:34  Patient On (Oxygen Delivery Method): room air      I&O's Summary    27 Sep 2023 07:01  -  28 Sep 2023 07:00  --------------------------------------------------------  IN: 2930 mL / OUT: 1375 mL / NET: 1555 mL    28 Sep 2023 07:01  -  28 Sep 2023 12:07  --------------------------------------------------------  IN: 390 mL / OUT: 410 mL / NET: -20 mL        PHYSICAL EXAM:  General: AOX3, NAD, sitting in chair, speaking in full sentences, no labored breathing on RA  HEENT: AT/NC, no facial asymmetry  Lungs: poor inspiration, no crackles, no wheeses  Heart: RRR   Abdomen: soft mildly distended, no tenderness, + BS  Extremities: warm, no edema, no tenderness, no calf tenderness, no focal deficit    LABS:                        13.7   8.47  )-----------( 172      ( 28 Sep 2023 07:21 )             41.5     09-28    140  |  106  |  22  ----------------------------<  100<H>  3.9   |  25  |  0.96    Ca    8.6      28 Sep 2023 07:21  Phos  2.4     09-28  Mg     2.0     09-28    TPro  5.5<L>  /  Alb  2.8<L>  /  TBili  1.2  /  DBili  0.4<H>  /  AST  9<L>  /  ALT  11  /  AlkPhos  40  09-27    LIVER FUNCTIONS - ( 27 Sep 2023 05:50 )  Alb: 2.8 g/dL / Pro: 5.5 g/dL / ALK PHOS: 40 U/L / ALT: 11 U/L / AST: 9 U/L / GGT: x             CAPILLARY BLOOD GLUCOSE        Urinalysis Basic - ( 28 Sep 2023 07:21 )    Color: x / Appearance: x / SG: x / pH: x  Gluc: 100 mg/dL / Ketone: x  / Bili: x / Urobili: x   Blood: x / Protein: x / Nitrite: x   Leuk Esterase: x / RBC: x / WBC x   Sq Epi: x / Non Sq Epi: x / Bacteria: x        MICRODATA:      RADIOLOGY/OTHER STUDIES:  
Feeling much better today.  Denies any significant pain.  Started having bowel movements.  Tolerating liquid diet.      Remaining ROS negative     PHYSICAL EXAM:    General: no acute distress, sitting up in chair  HEENT: NC/AT; MMM, interval removal of NGT  Cardiovascular: +S1/S2, RRR, no mrg  Respiratory: CTA B/L; no W/R/R  Gastrointestinal: soft, NT/ND; +BSx4  Extremities: WWP; no edema  Neurological: no focal deficits  Psychiatric: pleasant mood and affect    VITAL SIGNS:  Vital Signs Last 24 Hrs  T(C): 36.3 (29 Sep 2023 13:58), Max: 36.9 (28 Sep 2023 21:45)  T(F): 97.4 (29 Sep 2023 13:58), Max: 98.5 (28 Sep 2023 21:45)  HR: 74 (29 Sep 2023 12:15) (72 - 82)  BP: 138/88 (29 Sep 2023 12:15) (133/79 - 146/80)  BP(mean): 105 (29 Sep 2023 12:15) (98 - 108)  RR: 18 (29 Sep 2023 12:15) (17 - 18)  SpO2: 94% (29 Sep 2023 12:15) (92% - 96%)    Parameters below as of 29 Sep 2023 12:15  Patient On (Oxygen Delivery Method): room air    MEDICATIONS:  MEDICATIONS  (STANDING):  heparin   Injectable 5000 Unit(s) SubCutaneous every 8 hours  influenza   Vaccine 0.5 milliLiter(s) IntraMuscular once  pantoprazole  Injectable 40 milliGRAM(s) IV Push daily  piperacillin/tazobactam IVPB.. 3.375 Gram(s) IV Intermittent every 8 hours    MEDICATIONS  (PRN):  acetaminophen   IVPB .. 1000 milliGRAM(s) IV Intermittent once PRN Mild Pain (1 - 3), Moderate Pain (4 - 6), Severe Pain (7 - 10)  metoclopramide Injectable 10 milliGRAM(s) IV Push two times a day PRN nausea  ondansetron Injectable 4 milliGRAM(s) IV Push every 6 hours PRN Nausea and/or Vomiting    ALLERGIES:  Allergies    No Known Allergies    Intolerances    LABS:                        13.6   8.51  )-----------( 198      ( 29 Sep 2023 06:49 )             40.5     09-29    140  |  105  |  16  ----------------------------<  105<H>  3.3<L>   |  26  |  1.04    Ca    8.2<L>      29 Sep 2023 06:49  Phos  2.5     09-29  Mg     1.8     09-29        Urinalysis Basic - ( 29 Sep 2023 06:49 )    Color: x / Appearance: x / SG: x / pH: x  Gluc: 105 mg/dL / Ketone: x  / Bili: x / Urobili: x   Blood: x / Protein: x / Nitrite: x   Leuk Esterase: x / RBC: x / WBC x   Sq Epi: x / Non Sq Epi: x / Bacteria: x      CAPILLARY BLOOD GLUCOSE          RADIOLOGY & ADDITIONAL TESTS: Reviewed.
INTERVAL HPI/OVERNIGHT EVENTS:   SURGERY ATTENDING    STATUS POST:      Laparoscopic Appendectomy , JANE, Evacuation of Pelvic and all  4 quadrants abscesses, Abdominal washout, Drainage        POST OPERATIVE DAY #: 2    SUBJECTIVE:  Flatus: [ ] YES [x ] NO             Bowel Movement: [ ] YES [x ] NO  Pain (0-10):       3     Pain Control Adequate: [x ] YES [ ] NO  Nausea: [ ] YES [x ] NO            Vomiting: [ ] YES [x ] NO  Diarrhea: [ ] YES [x ] NO         Constipation: [ ] YES [x ] NO     Chest Pain: [ ] YES [x ] NO    SOB:  [ ] YES [x ] NO    MEDICATIONS  (STANDING):  heparin   Injectable 5000 Unit(s) SubCutaneous every 8 hours  lactated ringers. 1000 milliLiter(s) (130 mL/Hr) IV Continuous <Continuous>  pantoprazole  Injectable 40 milliGRAM(s) IV Push daily  piperacillin/tazobactam IVPB.. 3.375 Gram(s) IV Intermittent every 8 hours  potassium phosphate IVPB 30 milliMole(s) IV Intermittent once    MEDICATIONS  (PRN):  metoclopramide Injectable 10 milliGRAM(s) IV Push two times a day PRN nausea  ondansetron Injectable 4 milliGRAM(s) IV Push every 6 hours PRN Nausea and/or Vomiting      Vital Signs Last 24 Hrs  T(C): 36.8 (27 Sep 2023 09:24), Max: 38.2 (26 Sep 2023 17:00)  T(F): 98.2 (27 Sep 2023 09:24), Max: 100.7 (26 Sep 2023 17:00)  HR: 74 (27 Sep 2023 08:46) (70 - 78)  BP: 139/83 (27 Sep 2023 08:46) (132/82 - 148/97)  BP(mean): 105 (27 Sep 2023 08:46) (101 - 117)  RR: 17 (27 Sep 2023 08:46) (16 - 17)  SpO2: 94% (27 Sep 2023 08:46) (92% - 96%)    Parameters below as of 27 Sep 2023 08:46  Patient On (Oxygen Delivery Method): nasal cannula  O2 Flow (L/min): 2      PHYSICAL EXAM:      Constitutional:    Eyes:    ENMT:    Neck:    Breasts:    Back:    Respiratory:    Cardiovascular:    Gastrointestinal:    Genitourinary:    Rectal:    Extremities:    Vascular:    Neurological:    Skin:    Lymph Nodes:    Musculoskeletal:    Psychiatric:        I&O's Detail    26 Sep 2023 07:01  -  27 Sep 2023 07:00  --------------------------------------------------------  IN:    IV PiggyBack: 100 mL    IV PiggyBack: 675 mL    Lactated Ringers: 3120 mL  Total IN: 3895 mL    OUT:    Bulb (mL): 245 mL    Indwelling Catheter - Urethral (mL): 1600 mL    Nasogastric/Oral tube (mL): 1150 mL    Voided (mL): 375 mL  Total OUT: 3370 mL    Total NET: 525 mL          LABS:                        13.7   12.29 )-----------( 149      ( 27 Sep 2023 05:50 )             40.3     09-27    138  |  102  |  20  ----------------------------<  129<H>  3.8   |  28  |  1.01    Ca    8.4      27 Sep 2023 05:50  Phos  2.0     09-27  Mg     2.0     09-27    TPro  5.5<L>  /  Alb  2.8<L>  /  TBili  1.2  /  DBili  0.4<H>  /  AST  9<L>  /  ALT  11  /  AlkPhos  40  09-27      Urinalysis Basic - ( 27 Sep 2023 05:50 )    Color: x / Appearance: x / SG: x / pH: x  Gluc: 129 mg/dL / Ketone: x  / Bili: x / Urobili: x   Blood: x / Protein: x / Nitrite: x   Leuk Esterase: x / RBC: x / WBC x   Sq Epi: x / Non Sq Epi: x / Bacteria: x        RADIOLOGY & ADDITIONAL STUDIES:
STATUS POST:  laparoscopic appendectomy      SUBJECTIVE: Patient seen and examined bedside by chief resident. +flatus. no BM. tolerating CLD.     heparin   Injectable 5000 Unit(s) SubCutaneous every 8 hours  piperacillin/tazobactam IVPB.. 3.375 Gram(s) IV Intermittent every 8 hours      Vital Signs Last 24 Hrs  T(C): 37.1 (26 Sep 2023 04:39), Max: 37.1 (25 Sep 2023 18:00)  T(F): 98.8 (26 Sep 2023 04:39), Max: 98.8 (25 Sep 2023 18:00)  HR: 80 (26 Sep 2023 03:42) (78 - 92)  BP: 133/79 (26 Sep 2023 03:42) (119/66 - 137/87)  BP(mean): 98 (26 Sep 2023 03:42) (85 - 106)  RR: 17 (26 Sep 2023 03:42) (17 - 17)  SpO2: 96% (26 Sep 2023 03:42) (93% - 96%)    Parameters below as of 26 Sep 2023 03:42  Patient On (Oxygen Delivery Method): room air      I&O's Detail    25 Sep 2023 07:01  -  26 Sep 2023 07:00  --------------------------------------------------------  IN:    Lactated Ringers: 1855 mL    Lactated Ringers: 1040 mL  Total IN: 2895 mL    OUT:    Bulb (mL): 100 mL    Indwelling Catheter - Urethral (mL): 2000 mL  Total OUT: 2100 mL    Total NET: 795 mL          General: NAD, resting comfortably in bed  C/V: NSR  Pulm: Nonlabored breathing, no respiratory distress  Abd: soft, NT/ND. GETACHEW with minimal serosang fluid  Extrem: WWP, no edema, SCDs in place        LABS:                        13.6   13.94 )-----------( 126      ( 26 Sep 2023 05:04 )             38.9     09-26    138  |  102  |  16  ----------------------------<  127<H>  3.9   |  26  |  1.13    Ca    9.0      26 Sep 2023 05:04  Phos  2.0     09-26  Mg     1.9     09-26    TPro  6.1  /  Alb  3.2<L>  /  TBili  2.2<H>  /  DBili  x   /  AST  10  /  ALT  12  /  AlkPhos  39<L>  09-26    PT/INR - ( 24 Sep 2023 23:45 )   PT: 13.0 sec;   INR: 1.16          PTT - ( 24 Sep 2023 23:45 )  PTT:28.3 sec  Urinalysis Basic - ( 26 Sep 2023 05:04 )    Color: x / Appearance: x / SG: x / pH: x  Gluc: 127 mg/dL / Ketone: x  / Bili: x / Urobili: x   Blood: x / Protein: x / Nitrite: x   Leuk Esterase: x / RBC: x / WBC x   Sq Epi: x / Non Sq Epi: x / Bacteria: x        RADIOLOGY & ADDITIONAL STUDIES:  
STATUS POST:  laparoscopic appendectomy    POST OPERATIVE DAY #: 0    SUBJECTIVE: Patient seen and examined at bedside with chief resident. Patient states that he is feeling well, he endorses mild abdominal soreness but states that his pain is well controlled. He denies nausea, vomiting, shortness of breath, and chest pain.       heparin   Injectable 5000 Unit(s) SubCutaneous every 8 hours  piperacillin/tazobactam IVPB.. 3.375 Gram(s) IV Intermittent every 8 hours      Vital Signs Last 24 Hrs  T(C): 36.7 (25 Sep 2023 14:23), Max: 37.3 (24 Sep 2023 20:46)  T(F): 98.1 (25 Sep 2023 14:23), Max: 99.1 (24 Sep 2023 20:46)  HR: 88 (25 Sep 2023 08:25) (73 - 100)  BP: 119/66 (25 Sep 2023 08:25) (113/61 - 152/76)  BP(mean): 85 (25 Sep 2023 08:25) (79 - 95)  RR: 18 (25 Sep 2023 05:01) (15 - 20)  SpO2: 93% (25 Sep 2023 08:25) (93% - 99%)    Parameters below as of 25 Sep 2023 08:25  Patient On (Oxygen Delivery Method): room air      I&O's Detail    24 Sep 2023 07:01  -  25 Sep 2023 07:00  --------------------------------------------------------  IN:    Lactated Ringers: 625 mL  Total IN: 625 mL    OUT:    Bulb (mL): 200 mL    Indwelling Catheter - Urethral (mL): 555 mL  Total OUT: 755 mL    Total NET: -130 mL      25 Sep 2023 07:01  -  25 Sep 2023 15:56  --------------------------------------------------------  IN:    Lactated Ringers: 125 mL  Total IN: 125 mL    OUT:  Total OUT: 0 mL    Total NET: 125 mL          General: NAD, resting comfortably in bed  C/V: NSR  Pulm: Nonlabored breathing, no respiratory distress  Abd: appropriate ttp, incisions clean/dry/intact, mildly distended  Extrem: WWP, no edema, SCDs in place      LABS:                        13.7   17.80 )-----------( 134      ( 25 Sep 2023 05:30 )             39.2     09-25    138  |  106  |  13  ----------------------------<  165<H>  4.3   |  24  |  1.14    Ca    8.7      25 Sep 2023 05:30  Phos  2.6     09-25  Mg     1.5     09-25    TPro  5.8<L>  /  Alb  3.3  /  TBili  2.3<H>  /  DBili  x   /  AST  9<L>  /  ALT  13  /  AlkPhos  42  09-25    PT/INR - ( 24 Sep 2023 23:45 )   PT: 13.0 sec;   INR: 1.16          PTT - ( 24 Sep 2023 23:45 )  PTT:28.3 sec  Urinalysis Basic - ( 25 Sep 2023 05:30 )    Color: x / Appearance: x / SG: x / pH: x  Gluc: 165 mg/dL / Ketone: x  / Bili: x / Urobili: x   Blood: x / Protein: x / Nitrite: x   Leuk Esterase: x / RBC: x / WBC x   Sq Epi: x / Non Sq Epi: x / Bacteria: x        RADIOLOGY & ADDITIONAL STUDIES:  
53 yr old male pmh of GERD who presented to [facility] ED after 1x day hx(Since 9/23) of severe RLQ abdominal pain, nausea, vomiting, chills and fever and labs significant for a WBC of 23.75 and CTAP of dilated 2.1cm appendix w/phlegmonous character consistent with a diagnosis of acute appendicitis s/p Laparoscopic Appendectomy , JANE, Evacuation of Pelvic and all  4 quadrants abscesses, Abdominal washout, Drainage called to evaluate bili    Pt seen and examined     REVIEW OF SYSTEMS:  Constitutional: No fever,   Cardiovascular: No chest pain, palpitations, dizziness or leg swelling  Gastrointestinal: denies pain. No nausea, vomiting or hematemesis; No BMs yet and no flatus  Skin: No itching, burning, rashes or lesions       MEDICATIONS:  MEDICATIONS  (STANDING):  heparin   Injectable 5000 Unit(s) SubCutaneous every 8 hours  lactated ringers. 1000 milliLiter(s) (140 mL/Hr) IV Continuous <Continuous>  piperacillin/tazobactam IVPB.. 3.375 Gram(s) IV Intermittent every 8 hours    MEDICATIONS  (PRN):  HYDROmorphone  Injectable 0.25 milliGRAM(s) IV Push every 4 hours PRN Moderate Pain (4 - 6)  HYDROmorphone  Injectable 0.5 milliGRAM(s) IV Push every 30 minutes PRN Severe Pain (7 - 10)  HYDROmorphone  Injectable 0.5 milliGRAM(s) IV Push every 4 hours PRN Severe Pain (7 - 10)  ondansetron Injectable 4 milliGRAM(s) IV Push every 6 hours PRN Nausea and/or Vomiting      Allergies    No Known Allergies    Intolerances        Vital Signs Last 24 Hrs  T(C): 36.7 (25 Sep 2023 14:23), Max: 37.3 (24 Sep 2023 20:46)  T(F): 98.1 (25 Sep 2023 14:23), Max: 99.1 (24 Sep 2023 20:46)  HR: 84 (25 Sep 2023 16:30) (73 - 100)  BP: 132/76 (25 Sep 2023 16:30) (113/61 - 152/76)  BP(mean): 97 (25 Sep 2023 16:30) (79 - 97)  RR: 17 (25 Sep 2023 16:30) (15 - 20)  SpO2: 94% (25 Sep 2023 16:30) (93% - 99%)    Parameters below as of 25 Sep 2023 12:00  Patient On (Oxygen Delivery Method): room air        09-24 @ 07:01  -  09-25 @ 07:00  --------------------------------------------------------  IN: 625 mL / OUT: 755 mL / NET: -130 mL    09-25 @ 07:01  -  09-25 @ 17:14  --------------------------------------------------------  IN: 1435 mL / OUT: 1340 mL / NET: 95 mL        PHYSICAL EXAM:    General:  in no acute distress  HEENT: MMM, conjunctiva and sclera clear  Gastrointestinal: Soft non-tender distended; Normal bowel sounds; drain  Skin: Warm and dry. No obvious rash    LABS:      CBC Full  -  ( 25 Sep 2023 05:30 )  WBC Count : 17.80 K/uL  RBC Count : 4.25 M/uL  Hemoglobin : 13.7 g/dL  Hematocrit : 39.2 %  Platelet Count - Automated : 134 K/uL  Mean Cell Volume : 92.2 fl  Mean Cell Hemoglobin : 32.2 pg  Mean Cell Hemoglobin Concentration : 34.9 gm/dL  Auto Neutrophil # : x  Auto Lymphocyte # : x  Auto Monocyte # : x  Auto Eosinophil # : x  Auto Basophil # : x  Auto Neutrophil % : x  Auto Lymphocyte % : x  Auto Monocyte % : x  Auto Eosinophil % : x  Auto Basophil % : x    09-25    138  |  106  |  13  ----------------------------<  165<H>  4.3   |  24  |  1.14    Ca    8.7      25 Sep 2023 05:30  Phos  2.6     09-25  Mg     1.5     09-25    TPro  5.8<L>  /  Alb  3.3  /  TBili  2.2<H>  /  DBili  0.5<H>  /  AST  10  /  ALT  13  /  AlkPhos  37<L>  09-25    PT/INR - ( 24 Sep 2023 23:45 )   PT: 13.0 sec;   INR: 1.16          PTT - ( 24 Sep 2023 23:45 )  PTT:28.3 sec      Urinalysis Basic - ( 25 Sep 2023 05:30 )    Color: x / Appearance: x / SG: x / pH: x  Gluc: 165 mg/dL / Ketone: x  / Bili: x / Urobili: x   Blood: x / Protein: x / Nitrite: x   Leuk Esterase: x / RBC: x / WBC x   Sq Epi: x / Non Sq Epi: x / Bacteria: x                RADIOLOGY & ADDITIONAL STUDIES (The following images were personally reviewed):< from: CT Abdomen and Pelvis w/ IV Cont (09.24.23 @ 22:00) >  CT of the Abdomen and Pelvis was performed.  Sagittal and coronal reformats were performed.    FINDINGS:  LOWER CHEST: Bibasilar dependent atelectasis. Few left lower lobe   micronodules.    LIVER: 1.2 x 0.7 cm ill-defined hypodense lesion in segment 8. Smaller   ill-defined hypodense lesion in segment 7.  BILE DUCTS: Normal caliber.  GALLBLADDER: Within normal limits.  SPLEEN: Two ill-defined enhancing lesions within the posterior spleen   measuring up to 1 cm.  PANCREAS: Within normal limits.  ADRENALS: Within normal limits.  KIDNEYS/URETERS: No hydronephrosis or nephrolithiasis. Small left renal   cyst.    BLADDER: Within normal limits.  REPRODUCTIVE ORGANS: Prostate mildly enlarged and protrudes into the   bladder floor.    BOWEL: Dilated appendix up to 2.1 cm (2:98), contains fluid and fecal   material, and demonstrates pronounced surrounding inflammatory changes   consistent with acute appendicitis. No extraluminal gas or organized   collection. No bowel obstruction. Descending and sigmoid diverticulosis.  PERITONEUM: No ascites.  VESSELS: Within normal limits.  RETROPERITONEUM/LYMPH NODES: No lymphadenopathy.  ABDOMINAL WALL: Within normal limits.  BONES: Severe degenerative disc disease at L5-S1. Benign sclerotic   densities within the left iliac bone and left intertrochanteric femur.    IMPRESSION:  1.  Acute appendicitis with surrounding phlegmonous change. No   extraluminal gas or organized collection.  2.  Two ill-defined hypodense lesions in the right hepatic lobe measuring   up to 1.2 cm, indeterminate. MRI of the abdomen with and without   gadolinium would be useful for further characterization of liver lesion.  3.  Two ill-defined enhancing lesions within the spleen measuring up to 1   cm, indeterminate. This can also be further evaluated on MRI.    < end of copied text >    
Pt seen and examined   complaining of reflux  took famotine which he uses at home    REVIEW OF SYSTEMS:  Constitutional: No fever,  Cardiovascular: No chest pain, palpitations, dizziness   Gastrointestinal: No abdominal or epigastric pain. No nausea, vomiting  Skin: No itching, burning, rashes or lesions       MEDICATIONS:  MEDICATIONS  (STANDING):  famotidine    Tablet 20 milliGRAM(s) Oral daily  heparin   Injectable 5000 Unit(s) SubCutaneous every 8 hours  lactated ringers. 1000 milliLiter(s) (130 mL/Hr) IV Continuous <Continuous>  metoclopramide Injectable 10 milliGRAM(s) IV Push once  piperacillin/tazobactam IVPB.. 3.375 Gram(s) IV Intermittent every 8 hours  tamsulosin 0.4 milliGRAM(s) Oral at bedtime    MEDICATIONS  (PRN):  HYDROmorphone  Injectable 0.25 milliGRAM(s) IV Push every 4 hours PRN Moderate Pain (4 - 6)  HYDROmorphone  Injectable 0.5 milliGRAM(s) IV Push every 30 minutes PRN Severe Pain (7 - 10)  HYDROmorphone  Injectable 0.5 milliGRAM(s) IV Push every 4 hours PRN Severe Pain (7 - 10)  ondansetron Injectable 4 milliGRAM(s) IV Push every 6 hours PRN Nausea and/or Vomiting      Allergies    No Known Allergies    Intolerances        Vital Signs Last 24 Hrs  T(C): 36.9 (26 Sep 2023 09:11), Max: 37.1 (25 Sep 2023 18:00)  T(F): 98.4 (26 Sep 2023 09:11), Max: 98.8 (25 Sep 2023 18:00)  HR: 84 (26 Sep 2023 09:00) (78 - 92)  BP: 131/78 (26 Sep 2023 09:00) (128/64 - 137/87)  BP(mean): 100 (26 Sep 2023 09:00) (88 - 106)  RR: 16 (26 Sep 2023 09:00) (16 - 17)  SpO2: 93% (26 Sep 2023 09:00) (93% - 96%)    Parameters below as of 26 Sep 2023 09:00  Patient On (Oxygen Delivery Method): nasal cannula  O2 Flow (L/min): 2      09-25 @ 07:01  -  09-26 @ 07:00  --------------------------------------------------------  IN: 2895 mL / OUT: 2100 mL / NET: 795 mL    09-26 @ 07:01  -  09-26 @ 11:37  --------------------------------------------------------  IN: 745 mL / OUT: 325 mL / NET: 420 mL        PHYSICAL EXAM:    General:  in no acute distress  HEENT: MMM, conjunctiva and sclera clear  Gastrointestinal: Soft non-tender less-distended; Normal bowel sounds; GETACHEW  Skin: Warm and dry. No obvious rash    LABS:      CBC Full  -  ( 26 Sep 2023 05:04 )  WBC Count : 13.94 K/uL  RBC Count : 4.19 M/uL  Hemoglobin : 13.6 g/dL  Hematocrit : 38.9 %  Platelet Count - Automated : 126 K/uL  Mean Cell Volume : 92.8 fl  Mean Cell Hemoglobin : 32.5 pg  Mean Cell Hemoglobin Concentration : 35.0 gm/dL  Auto Neutrophil # : x  Auto Lymphocyte # : x  Auto Monocyte # : x  Auto Eosinophil # : x  Auto Basophil # : x  Auto Neutrophil % : x  Auto Lymphocyte % : x  Auto Monocyte % : x  Auto Eosinophil % : x  Auto Basophil % : x    09-26    138  |  102  |  16  ----------------------------<  127<H>  3.9   |  26  |  1.13    Ca    9.0      26 Sep 2023 05:04  Phos  2.0     09-26  Mg     1.9     09-26    TPro  6.1  /  Alb  3.2<L>  /  TBili  2.2<H>  /  DBili  x   /  AST  10  /  ALT  12  /  AlkPhos  39<L>  09-26    PT/INR - ( 24 Sep 2023 23:45 )   PT: 13.0 sec;   INR: 1.16          PTT - ( 24 Sep 2023 23:45 )  PTT:28.3 sec      Urinalysis Basic - ( 26 Sep 2023 05:04 )    Color: x / Appearance: x / SG: x / pH: x  Gluc: 127 mg/dL / Ketone: x  / Bili: x / Urobili: x   Blood: x / Protein: x / Nitrite: x   Leuk Esterase: x / RBC: x / WBC x   Sq Epi: x / Non Sq Epi: x / Bacteria: x                RADIOLOGY & ADDITIONAL STUDIES (The following images were personally reviewed):
Pt seen and examined   walked with PT  NGT out    REVIEW OF SYSTEMS:  Constitutional: No fever,  Cardiovascular: No chest pain, palpitations, dizziness   Gastrointestinal: No abdominal or epigastric pain. No nausea, vomiting + flatus  Skin: No itching, burning, rashes or lesions       MEDICATIONS:  MEDICATIONS  (STANDING):  dextrose 5% + sodium chloride 0.45%. 1000 milliLiter(s) (130 mL/Hr) IV Continuous <Continuous>  heparin   Injectable 5000 Unit(s) SubCutaneous every 8 hours  pantoprazole  Injectable 40 milliGRAM(s) IV Push daily  piperacillin/tazobactam IVPB.. 3.375 Gram(s) IV Intermittent every 8 hours    MEDICATIONS  (PRN):  acetaminophen   IVPB .. 1000 milliGRAM(s) IV Intermittent once PRN Mild Pain (1 - 3), Moderate Pain (4 - 6), Severe Pain (7 - 10)  metoclopramide Injectable 10 milliGRAM(s) IV Push two times a day PRN nausea  ondansetron Injectable 4 milliGRAM(s) IV Push every 6 hours PRN Nausea and/or Vomiting      Allergies    No Known Allergies    Intolerances        Vital Signs Last 24 Hrs  T(C): 36.9 (28 Sep 2023 10:19), Max: 36.9 (28 Sep 2023 10:19)  T(F): 98.5 (28 Sep 2023 10:19), Max: 98.5 (28 Sep 2023 10:19)  HR: 76 (28 Sep 2023 08:34) (62 - 84)  BP: 139/89 (28 Sep 2023 08:34) (122/89 - 139/89)  BP(mean): 108 (28 Sep 2023 08:34) (96 - 108)  RR: 18 (28 Sep 2023 08:34) (17 - 18)  SpO2: 96% (28 Sep 2023 08:34) (93% - 96%)    Parameters below as of 28 Sep 2023 08:34  Patient On (Oxygen Delivery Method): room air        09-27 @ 07:01  -  09-28 @ 07:00  --------------------------------------------------------  IN: 2930 mL / OUT: 1375 mL / NET: 1555 mL    09-28 @ 07:01  -  09-28 @ 12:43  --------------------------------------------------------  IN: 390 mL / OUT: 410 mL / NET: -20 mL        PHYSICAL EXAM:    General: ; in no acute distress  HEENT: MMM, conjunctiva and sclera clear  Gastrointestinal: less-distended;   Skin: Warm and dry. No obvious rash    LABS:      CBC Full  -  ( 28 Sep 2023 07:21 )  WBC Count : 8.47 K/uL  RBC Count : 4.32 M/uL  Hemoglobin : 13.7 g/dL  Hematocrit : 41.5 %  Platelet Count - Automated : 172 K/uL  Mean Cell Volume : 96.1 fl  Mean Cell Hemoglobin : 31.7 pg  Mean Cell Hemoglobin Concentration : 33.0 gm/dL  Auto Neutrophil # : x  Auto Lymphocyte # : x  Auto Monocyte # : x  Auto Eosinophil # : x  Auto Basophil # : x  Auto Neutrophil % : x  Auto Lymphocyte % : x  Auto Monocyte % : x  Auto Eosinophil % : x  Auto Basophil % : x    09-28    140  |  106  |  22  ----------------------------<  100<H>  3.9   |  25  |  0.96    Ca    8.6      28 Sep 2023 07:21  Phos  2.4     09-28  Mg     2.0     09-28    TPro  5.5<L>  /  Alb  2.8<L>  /  TBili  1.2  /  DBili  0.4<H>  /  AST  9<L>  /  ALT  11  /  AlkPhos  40  09-27          Urinalysis Basic - ( 28 Sep 2023 07:21 )    Color: x / Appearance: x / SG: x / pH: x  Gluc: 100 mg/dL / Ketone: x  / Bili: x / Urobili: x   Blood: x / Protein: x / Nitrite: x   Leuk Esterase: x / RBC: x / WBC x   Sq Epi: x / Non Sq Epi: x / Bacteria: x                RADIOLOGY & ADDITIONAL STUDIES (The following images were personally reviewed):

## 2023-09-29 NOTE — PROGRESS NOTE ADULT - ASSESSMENT
Patient is a 53 yr old male pmh of GERD who presented to [facility] ED after 1x day hx(Since 9/23) of severe RLQ abdominal pain, nausea, vomiting, chills and fever and labs significant for a WBC of 23.75 and CTAP of dilated 2.1cm appendix w/phlegmonous character consistent with a diagnosis of acute appendicitis. He is now s/p laparoscopic appendectomy on 9/25    CLD  Pain/Nausea PRN  Zosyn  SCD/SQH  19 fr neto

## 2023-10-02 LAB — SURGICAL PATHOLOGY STUDY: SIGNIFICANT CHANGE UP

## 2023-10-05 DIAGNOSIS — A41.9 SEPSIS, UNSPECIFIED ORGANISM: ICD-10-CM

## 2023-10-05 DIAGNOSIS — E80.4 GILBERT SYNDROME: ICD-10-CM

## 2023-10-05 DIAGNOSIS — D69.6 THROMBOCYTOPENIA, UNSPECIFIED: ICD-10-CM

## 2023-10-05 DIAGNOSIS — K21.9 GASTRO-ESOPHAGEAL REFLUX DISEASE WITHOUT ESOPHAGITIS: ICD-10-CM

## 2023-10-05 DIAGNOSIS — E80.7 DISORDER OF BILIRUBIN METABOLISM, UNSPECIFIED: ICD-10-CM

## 2023-10-05 DIAGNOSIS — E83.42 HYPOMAGNESEMIA: ICD-10-CM

## 2023-10-05 DIAGNOSIS — E83.39 OTHER DISORDERS OF PHOSPHORUS METABOLISM: ICD-10-CM

## 2023-10-05 DIAGNOSIS — K35.33 ACUTE APPENDICITIS WITH PERFORATION, LOCALIZED PERITONITIS, AND GANGRENE, WITH ABSCESS: ICD-10-CM
